# Patient Record
Sex: MALE | Race: WHITE
[De-identification: names, ages, dates, MRNs, and addresses within clinical notes are randomized per-mention and may not be internally consistent; named-entity substitution may affect disease eponyms.]

---

## 2017-05-05 ENCOUNTER — HOSPITAL ENCOUNTER (EMERGENCY)
Dept: HOSPITAL 17 - NEPD | Age: 52
Discharge: HOME | End: 2017-05-05
Payer: SELF-PAY

## 2017-05-05 VITALS
SYSTOLIC BLOOD PRESSURE: 135 MMHG | RESPIRATION RATE: 16 BRPM | HEART RATE: 74 BPM | OXYGEN SATURATION: 99 % | TEMPERATURE: 98.2 F | DIASTOLIC BLOOD PRESSURE: 74 MMHG

## 2017-05-05 VITALS — BODY MASS INDEX: 25.06 KG/M2 | HEIGHT: 68 IN | WEIGHT: 165.35 LBS

## 2017-05-05 DIAGNOSIS — F32.9: Primary | ICD-10-CM

## 2017-05-05 PROCEDURE — 99283 EMERGENCY DEPT VISIT LOW MDM: CPT

## 2017-05-05 NOTE — PD
HPI


Chief Complaint:  Depression


Time Seen by Provider:  16:42


Travel History


International Travel<30 days:  No


Contact w/Intl Traveler<30days:  No


Traveled to known affect area:  No





History of Present Illness


HPI


This patient has had long-standing low-grade depression.  He is a local patient 

with no family physician.  He is not suicidal or homicidal.  He has no 

intention to hurt himself.  He does binge on occasion but has not had any 

alcohol in the last few days.  He has no physical complaints whatsoever.  

Denies any drug use.  Severity is mild





PFSH


Past Medical History


Atrial Fibrillation:  Yes


Autoimmune Disease:  No


Anxiety:  Yes


Depression:  Yes


Heart Rhythm Problems:  Yes (A FIB)


Cardiovascular Problems:  Yes


Diminished Hearing:  No


Endocrine:  No (PER MEDICAL RECORD)


Gastrointestinal Disorders:  Yes


Genitourinary:  No


Hypertension:  Yes


Immune Disorder:  No


Musculoskeletal:  No


Neurologic:  Yes


Psychiatric:  Yes


Reproductive:  No


Respiratory:  No


Immunizations Current:  Yes


Seizures:  Yes


Ulcer:  Yes (GI ULCERS)





Past Surgical History


Endocrine Surgery:  No


Other Surgery:  Yes





Social History


Alcohol Use:  Yes


Tobacco Use:  Yes


Substance Use:  Yes





Allergies-Medications


(Allergen,Severity, Reaction):  


Coded Allergies:  


     No Known Allergies (Verified , 5/5/17)


Reported Meds & Prescriptions





Reported Meds & Active Scripts


Active


Clindamycin (Clindamycin HCl) 150 Mg Cap 2 Tab PO Q6H 10 Days


Naprosyn (Naproxen) 500 Mg Tab 500 Mg PO Q12HR PRN


Vitamin B-1 (Thiamine HCl) 100 Mg Tab 100 Mg PO DAILY


Folate (Folic Acid) 1 Mg Tab 1 Mg PO DAILY


Doxepin (Doxepin HCl) 50 Mg Cap 50 Mg PO BID


Calamine 8-8 % (Calamine-Zinc Oxide) 1 Lot Lot 1 Applic TOPICAL Q6H PRN


Aripiprazole 5 Mg Tab 5 Mg PO BID








Review of Systems


General / Constitutional:  No: Fever


HENT:  No: Headaches


Cardiovascular:  No: Chest Pain or Discomfort


Respiratory:  No: Cough





Physical Exam


Narrative


GASTROINTESTINAL:  Abdomen soft, non-tender, nondistended. Positive bowel 

sounds.  No hepato-splenomegaly, or palpable masses. No guarding.





SKIN: Focused skin assessment reveals no rash or ulcers. Skin is warm and dry.  

Palpation shows no induration or nodules.





NECK:  Symmetrical appearance, midline trachea.  No mass or crepitus.  Thyroid 

without enlargement, tenderness, or mass.





Data


Data


Last Documented VS





Vital Signs








  Date Time  Temp Pulse Resp B/P Pulse Ox O2 Delivery O2 Flow Rate FiO2


 


5/5/17 15:59 98.2 74 16 135/74 99   











MDM


Medical Decision Making


Medical Screen Exam Complete:  Yes


Emergency Medical Condition:  Yes


Medical Record Reviewed:  Yes


Differential Diagnosis


Depression, anxiety, adjustment disorder


Narrative Course


I have reviewed the patient's electronic medical record.





Patient is stable for outpatient follow-up.


He was given information to try to follow up with the [facility] clinic


He does not require emergent psychiatric screening





Diagnosis





 Primary Impression:  


 Depression


 Qualified Code:  F32.9 - Depression, unspecified depression type





***Additional Instructions:


The patient was advised to follow up with their physician and return if they 

worsen.


***Med/Other Pt SpecificInfo:  Other


Disposition:  01 DISCHARGE HOME


Condition:  Stable








Nilesh Gaspar MD May 5, 2017 16:46

## 2018-02-03 ENCOUNTER — HOSPITAL ENCOUNTER (EMERGENCY)
Dept: HOSPITAL 17 - NEPD | Age: 53
LOS: 1 days | Discharge: HOME | End: 2018-02-04
Payer: COMMERCIAL

## 2018-02-03 VITALS — WEIGHT: 132.28 LBS | HEIGHT: 65 IN | BODY MASS INDEX: 22.04 KG/M2

## 2018-02-03 VITALS
SYSTOLIC BLOOD PRESSURE: 137 MMHG | RESPIRATION RATE: 17 BRPM | HEART RATE: 89 BPM | TEMPERATURE: 97.8 F | DIASTOLIC BLOOD PRESSURE: 81 MMHG | OXYGEN SATURATION: 99 %

## 2018-02-03 DIAGNOSIS — F20.9: ICD-10-CM

## 2018-02-03 DIAGNOSIS — F17.200: ICD-10-CM

## 2018-02-03 DIAGNOSIS — F41.9: ICD-10-CM

## 2018-02-03 DIAGNOSIS — F32.9: ICD-10-CM

## 2018-02-03 DIAGNOSIS — F10.129: Primary | ICD-10-CM

## 2018-02-03 DIAGNOSIS — I10: ICD-10-CM

## 2018-02-03 DIAGNOSIS — I48.91: ICD-10-CM

## 2018-02-03 DIAGNOSIS — F12.90: ICD-10-CM

## 2018-02-03 PROCEDURE — 99282 EMERGENCY DEPT VISIT SF MDM: CPT
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
## 2018-02-23 ENCOUNTER — HOSPITAL ENCOUNTER (EMERGENCY)
Dept: HOSPITAL 17 - NEPC | Age: 53
Discharge: HOME | End: 2018-02-23
Payer: SELF-PAY

## 2018-02-23 VITALS
OXYGEN SATURATION: 98 % | RESPIRATION RATE: 18 BRPM | HEART RATE: 96 BPM | DIASTOLIC BLOOD PRESSURE: 78 MMHG | TEMPERATURE: 98.7 F | SYSTOLIC BLOOD PRESSURE: 177 MMHG

## 2018-02-23 VITALS — BODY MASS INDEX: 18.21 KG/M2 | HEIGHT: 68 IN | WEIGHT: 120.15 LBS

## 2018-02-23 DIAGNOSIS — S01.01XS: ICD-10-CM

## 2018-02-23 DIAGNOSIS — F41.9: ICD-10-CM

## 2018-02-23 DIAGNOSIS — I10: ICD-10-CM

## 2018-02-23 DIAGNOSIS — L02.414: Primary | ICD-10-CM

## 2018-02-23 DIAGNOSIS — F20.9: ICD-10-CM
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
## 2018-02-23 DIAGNOSIS — F32.9: ICD-10-CM

## 2018-02-23 DIAGNOSIS — I48.91: ICD-10-CM

## 2018-02-23 DIAGNOSIS — R56.9: ICD-10-CM

## 2018-02-23 PROCEDURE — 73090 X-RAY EXAM OF FOREARM: CPT

## 2018-02-23 PROCEDURE — 70450 CT HEAD/BRAIN W/O DYE: CPT

## 2018-02-23 PROCEDURE — 10060 I&D ABSCESS SIMPLE/SINGLE: CPT
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
## 2018-02-23 NOTE — RADRPT
EXAM DATE/TIME:  02/23/2018 09:04 

 

[facility] COMPARISON:     

CT FACIAL BONES W/O CONTRAST, July 12, 2017, 11:45.

 

 

INDICATIONS :     

Fall one week ago, skin complaint to forehead. 

                      

 

RADIATION DOSE:     

35.71 CTDIvol (mGy) 

 

 

 

MEDICAL HISTORY :     

Seizures. Hypertension. Cardiovascular disease

 

SURGICAL HISTORY :      

None. 

 

ENCOUNTER:      

Initial

 

ACUITY:      

1 day

 

PAIN SCALE:      

0/10

 

LOCATION:        

cranial 

 

TECHNIQUE:     

Multiple contiguous axial images were obtained of the head.  Using automated exposure control and adj
ustment of the mA and/or kV according to patient size, radiation dose was kept as low as reasonably a
chievable to obtain optimal diagnostic quality images.   DICOM format image data is available electro
nically for review and comparison.  

 

FINDINGS:     

 

CEREBRUM:     

The ventricles are normal for age.  No evidence of midline shift, mass lesion, hemorrhage or acute in
farction.  No extra-axial fluid collections are seen.

 

POSTERIOR FOSSA:     

The cerebellum and brainstem are intact.  The 4th ventricle is midline.  The cerebellopontine angle i
s unremarkable.

 

EXTRACRANIAL:     

The visualized portion of the orbits is intact.

 

SKULL:     

The calvaria is intact.  No evidence of skull fracture.

 

CONCLUSION:     

1. No acute intra-or abnormality identified.

 

 

 

 John Man MD on February 23, 2018 at 9:12           

Board Certified Radiologist.

 This report was verified electronically.

## 2018-02-23 NOTE — PD
Physical Exam


Time Seen by Provider:  10:26


Narrative


I was asked to incise and drain the abscess of the left forearm.





Data


Data


Last Documented VS





Vital Signs








  Date Time  Temp Pulse Resp B/P (MAP) Pulse Ox O2 Delivery O2 Flow Rate FiO2


 


2/23/18 08:19  94 18     


 


2/23/18 08:02 98.7   177/78 (111) 98 Room Air  








Orders





 Orders


Ct Brain W/O Iv Contrast(Rout) (2/23/18 )


Forearm (2vws) (2/23/18 )


Lidocai-Epi 2%-1:100,000 Inj (Xylocaine- (2/23/18 08:30)


Lidocai-Epi 2%-1:100,000 Inj (Xylocaine- (2/23/18 08:31)








MDM


Supervised Visit with AISHWARYA:  No


Narrative Course


I was asked to incise and drain the abscess of the left forearm.  On my 

examination of the abscess there is no fluctuance and it appears open and 

already drained.  There is some surrounding erythema.  The patient states that 

it has been draining purulent drainage and he has been squeezing it.  Wound 

culture obtained.  See Dr. Bejarano's note for final disposition.


Scripts


No Active Prescriptions or Reported Meds











Joan Belle Feb 23, 2018 10:28

## 2018-02-23 NOTE — RADRPT
EXAM DATE/TIME:  02/23/2018 09:25 

 

[facility] COMPARISON:     

No previous studies available for comparison.

 

                     

INDICATIONS :     

Left forearm pain, fall.

                     

 

MEDICAL HISTORY :     

None.          

 

SURGICAL HISTORY :     

None.   

 

ENCOUNTER:     

Initial                                        

 

ACUITY:     

2 weeks      

 

PAIN SCORE:     

5/10

 

LOCATION:     

Left middle forearm

 

FINDINGS:     

Two view examination of the left forearm demonstrates no evidence of fracture or dislocation.  Bony m
ineralization is normal.  There is prominent soft tissue swelling about the mid forearm; no radiopaqu
e foreign body seen..

 

CONCLUSION:     

1. The osseous structures are intact.

2. Prominent soft tissue thickening/swelling mid forearm.  A

 

 

 

 Chau Wilson MD on February 23, 2018 at 9:48           

Board Certified Radiologist.

 This report was verified electronically.

## 2018-02-27 ENCOUNTER — HOSPITAL ENCOUNTER (EMERGENCY)
Dept: HOSPITAL 17 - NEPK | Age: 53
Discharge: HOME | End: 2018-02-27
Payer: SELF-PAY

## 2018-02-27 VITALS
TEMPERATURE: 98.7 F | RESPIRATION RATE: 16 BRPM | SYSTOLIC BLOOD PRESSURE: 153 MMHG | DIASTOLIC BLOOD PRESSURE: 89 MMHG | OXYGEN SATURATION: 100 % | HEART RATE: 106 BPM
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
## 2018-02-27 DIAGNOSIS — T22.212D: Primary | ICD-10-CM

## 2018-02-27 PROCEDURE — 16000 INITIAL TREATMENT OF BURN(S): CPT
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
## 2018-05-21 ENCOUNTER — HOSPITAL ENCOUNTER (EMERGENCY)
Dept: HOSPITAL 17 - NEPE | Age: 53
Discharge: HOME | End: 2018-05-21
Payer: SELF-PAY

## 2018-05-21 VITALS
HEART RATE: 100 BPM | DIASTOLIC BLOOD PRESSURE: 85 MMHG | OXYGEN SATURATION: 97 % | RESPIRATION RATE: 18 BRPM | SYSTOLIC BLOOD PRESSURE: 127 MMHG

## 2018-05-21 VITALS
SYSTOLIC BLOOD PRESSURE: 133 MMHG | DIASTOLIC BLOOD PRESSURE: 98 MMHG | OXYGEN SATURATION: 78 % | HEART RATE: 100 BPM | RESPIRATION RATE: 20 BRPM | TEMPERATURE: 97.4 F

## 2018-05-21 VITALS — HEIGHT: 68 IN | WEIGHT: 143.3 LBS | BODY MASS INDEX: 21.72 KG/M2

## 2018-05-21 VITALS
OXYGEN SATURATION: 99 % | HEART RATE: 107 BPM | SYSTOLIC BLOOD PRESSURE: 108 MMHG | RESPIRATION RATE: 18 BRPM | DIASTOLIC BLOOD PRESSURE: 89 MMHG

## 2018-05-21 DIAGNOSIS — R74.8: ICD-10-CM

## 2018-05-21 DIAGNOSIS — F17.200: ICD-10-CM

## 2018-05-21 DIAGNOSIS — F12.90: ICD-10-CM

## 2018-05-21 DIAGNOSIS — G25.3: Primary | ICD-10-CM

## 2018-05-21 LAB
ALBUMIN SERPL-MCNC: 2.7 GM/DL (ref 3.4–5)
ALP SERPL-CCNC: 210 U/L (ref 45–117)
ALT SERPL-CCNC: 1369 U/L (ref 12–78)
AST SERPL-CCNC: 642 U/L (ref 15–37)
BASOPHILS # BLD AUTO: 0.1 TH/MM3 (ref 0–0.2)
BASOPHILS NFR BLD: 0.7 % (ref 0–2)
BILIRUB SERPL-MCNC: 0.4 MG/DL (ref 0.2–1)
BUN SERPL-MCNC: 29 MG/DL (ref 7–18)
CALCIUM SERPL-MCNC: 8.2 MG/DL (ref 8.5–10.1)
CHLORIDE SERPL-SCNC: 104 MEQ/L (ref 98–107)
CREAT SERPL-MCNC: 1.23 MG/DL (ref 0.6–1.3)
EOSINOPHIL # BLD: 0.1 TH/MM3 (ref 0–0.4)
EOSINOPHIL NFR BLD: 1 % (ref 0–4)
ERYTHROCYTE [DISTWIDTH] IN BLOOD BY AUTOMATED COUNT: 16.5 % (ref 11.6–17.2)
GLUCOSE SERPL-MCNC: 112 MG/DL (ref 74–106)
HCO3 BLD-SCNC: 25.3 MEQ/L (ref 21–32)
HCT VFR BLD CALC: 37.6 % (ref 39–51)
HGB BLD-MCNC: 12.1 GM/DL (ref 13–17)
LYMPHOCYTES # BLD AUTO: 1.2 TH/MM3 (ref 1–4.8)
LYMPHOCYTES NFR BLD AUTO: 12.5 % (ref 9–44)
MCH RBC QN AUTO: 29.2 PG (ref 27–34)
MCHC RBC AUTO-ENTMCNC: 32.2 % (ref 32–36)
MCV RBC AUTO: 90.5 FL (ref 80–100)
MONOCYTE #: 1.2 TH/MM3 (ref 0–0.9)
MONOCYTES NFR BLD: 12 % (ref 0–8)
NEUTROPHILS # BLD AUTO: 7.3 TH/MM3 (ref 1.8–7.7)
NEUTROPHILS NFR BLD AUTO: 73.8 % (ref 16–70)
PLATELET # BLD: 226 TH/MM3 (ref 150–450)
PMV BLD AUTO: 8.5 FL (ref 7–11)
PROT SERPL-MCNC: 5.8 GM/DL (ref 6.4–8.2)
RBC # BLD AUTO: 4.16 MIL/MM3 (ref 4.5–5.9)
SODIUM SERPL-SCNC: 137 MEQ/L (ref 136–145)
WBC # BLD AUTO: 10 TH/MM3 (ref 4–11)

## 2018-05-21 PROCEDURE — 80053 COMPREHEN METABOLIC PANEL: CPT

## 2018-05-21 PROCEDURE — 99284 EMERGENCY DEPT VISIT MOD MDM: CPT

## 2018-05-21 PROCEDURE — 96374 THER/PROPH/DIAG INJ IV PUSH: CPT

## 2018-05-21 PROCEDURE — 85025 COMPLETE CBC W/AUTO DIFF WBC: CPT

## 2018-05-21 PROCEDURE — 82550 ASSAY OF CK (CPK): CPT

## 2018-05-21 NOTE — PD
Physical Exam


Narrative


General: 


The patient is a well-developed disheveled appearing male in no acute distress.





Head and Neck exam: 


Head is normocephalic atraumatic. 


Eyes: EOMI, pupils are equal round and reactive to light. 


Nose: Midline septum with pink mucous membranes 


Mouth: Dentition unremarkable. Moist mucus membranes. Posterior oropharynx is 

not erythematous. No tonsillar hypertrophy. Uvula midline. Airway patent. 


Neck: No palpable lymphadenopathy. No nuchal rigidity. No thyromegaly. 





Cardiovascular: 


Sinus tachycardia in the low 100s without murmurs, gallops, or rubs. No pulse 

deficit to the extremities on simultaneous auscultation and palpation of his 

radial artery. 





Lungs: 


Clear to auscultation bilaterally. No wheezes, rhonchi, or rales.


 


Abdomen:


Soft, without tenderness to palpation in all 4 quadrants of the abdomen. No 

guarding, rebound, or rigidity.  Normal bowel sounds are audible.  No 

tenderness on palpation of McBurney's point.





Extremities: 


No clubbing, cyanosis, or edema. 2+ pulses in all 4 extremities.  No calf 

tenderness on palpation.





Back: 


No costovertebral angle tenderness to palpation. 





Neurologic Exam: Grossly nonfocal.  The patient has no further myoclonic 

jerking or repetitive movements noted.  No tremulousness noted.  No asterixis 

on exam.





Skin Exam: No rash noted. Intact skin that is warm and dry.





Data


Data


Last Documented VS





Vital Signs








  Date Time  Temp Pulse Resp B/P (MAP) Pulse Ox O2 Delivery O2 Flow Rate FiO2


 


5/21/18 19:59  100 18 127/85 (99) 97 Room Air  


 


5/21/18 19:12       4.00 


 


5/21/18 17:22 97.4       








Orders





 Orders


Complete Blood Count With Diff (5/21/18 18:21)


Comprehensive Metabolic Panel (5/21/18 18:21)


Creatine Kinase (Cpk) (5/21/18 18:21)


Lorazepam Inj (Ativan Inj) (5/21/18 18:45)


Sodium Chlorid 0.9% 500 Ml Inj (Ns 500 M (5/21/18 19:45)





Labs





Laboratory Tests








Test


  5/21/18


18:40


 


White Blood Count 10.0 TH/MM3 


 


Red Blood Count 4.16 MIL/MM3 


 


Hemoglobin 12.1 GM/DL 


 


Hematocrit 37.6 % 


 


Mean Corpuscular Volume 90.5 FL 


 


Mean Corpuscular Hemoglobin 29.2 PG 


 


Mean Corpuscular Hemoglobin


Concent 32.2 % 


 


 


Red Cell Distribution Width 16.5 % 


 


Platelet Count 226 TH/MM3 


 


Mean Platelet Volume 8.5 FL 


 


Neutrophils (%) (Auto) 73.8 % 


 


Lymphocytes (%) (Auto) 12.5 % 


 


Monocytes (%) (Auto) 12.0 % 


 


Eosinophils (%) (Auto) 1.0 % 


 


Basophils (%) (Auto) 0.7 % 


 


Neutrophils # (Auto) 7.3 TH/MM3 


 


Lymphocytes # (Auto) 1.2 TH/MM3 


 


Monocytes # (Auto) 1.2 TH/MM3 


 


Eosinophils # (Auto) 0.1 TH/MM3 


 


Basophils # (Auto) 0.1 TH/MM3 


 


CBC Comment DIFF FINAL 


 


Differential Comment  


 


Blood Urea Nitrogen 29 MG/DL 


 


Creatinine 1.23 MG/DL 


 


Random Glucose 112 MG/DL 


 


Total Protein 5.8 GM/DL 


 


Albumin 2.7 GM/DL 


 


Calcium Level 8.2 MG/DL 


 


Alkaline Phosphatase 210 U/L 


 


Aspartate Amino Transf


(AST/SGOT) 642 U/L 


 


 


Alanine Aminotransferase


(ALT/SGPT) 1369 U/L 


 


 


Total Bilirubin 0.4 MG/DL 


 


Sodium Level 137 MEQ/L 


 


Potassium Level 5.1 MEQ/L 


 


Chloride Level 104 MEQ/L 


 


Carbon Dioxide Level 25.3 MEQ/L 


 


Anion Gap 8 MEQ/L 


 


Total Creatine Kinase 225 U/L 











[facility]


Medical Record Reviewed:  Yes


Supervised Visit with AISHWARYA:  No


Narrative Course


During the course of the patient's emergency department visit, the patient's 

history, examination, and differential diagnosis were reviewed with the 

patient. The patient was placed on a cardiac monitor with oximetry and frequent 

blood pressure monitoring. The patient had  IV access obtained and blood work 

sent for analysis.  The patient's case was checked out to me by Dr. Gonzalez.  

Please see her complete history and physical.  The patient's case was checked 

out to me at the conclusion of her shift.  The patient presented with 

generalized body aches.  The patient reports having history of intermittent 

jerking movements of his extremities.  He reports that he has been diagnosed 

with restless leg syndrome.  The patient on my arrival is resting, sleeping 

lying on his left side with no abnormal movements noted.  A tray of food is 

available at the bedside and has been ingested by the patient.





The patient was initially provided Ativan 1 mg IV.





The patient's laboratory studies were reviewed and remarkable for a white count 

of 10, hemoglobin 12.1 which is stable compared to prior levels, platelets 226 

was 73.8 neutrophils, monocytes 12, CMP is remarkable for a BUN of 29, glucose 

112, calcium 8.2, , ALT 1369, alk phos 210, albumin 2.7.  CPK is within 

normal limits at 225 ruling out rhabdomyolysis.





The patient is noted to have elevated liver enzymes.  The patient has had 

elevated liver enzymes in the past.  There is some report of the patient 

abusing alcohol in the past, however the patient denies any alcohol abuse 

recently.  The patient reports that he is followed by the [facility] for his 

primary care.  The patient is given a copy of his recent laboratory studies.  

The patient will be given a lab slip to obtain repeat LFTs and a viral 

hepatitis panel in 1 week.  Patient is instructed to follow-up with a 

gastroenterologist regarding this.  The patient on examination has no abdominal 

pain noted.  Regarding the patient's myoclonic movements.  These resolved with 

the use of Ativan.  As recommended previously by Dr. Lala, the patient will be 

referred to a neurologist for follow-up.  He is given the name of the 

neurologist on-call, Dr. Banegas.  As well as a gastroenterologist on-call, 

Dr. Fontanez.  The patient is given a prescription for Klonopin for the 

myoclonic movements.  The patient regarding the elevated liver function tests 

is instructed to avoid alcohol and Tylenol/acetaminophen-containing substances.





The patient is resting comfortably and feels better, is alert and in no 

distress. The patient's results and examination findings were discussed with 

the patient. The repeat examination is unremarkable and benign. The history, 

exam, diagnostic testing, and current condition do not suggest any significant 

pathology to warrant further testing, continued ED treatment, admission, or 

surgical evaluation at this point. The vital signs have been stable. The 

patient does not have uncontrollable pain, intractable vomiting, or other 

significant symptoms. The patient's condition is stable and appropriate for 

discharge. The patient will pursue further outpatient evaluation with a primary 

care physician or other designated or consulting physician as indicated in the 

discharge instructions.  The patient is instructed to report back to the 

emergency department immediately for reexamination  in the mean time if he/ she 

develops any new or worsening signs or symptoms.  The patient expressed 

understanding and was agreeable with this plan.


Diagnosis





 Primary Impression:  


 Movement disorder


 Additional Impression:  


 Elevated liver enzymes


Referrals:  


Diogenes Banegas MD


1 week





Arya Rosario MD


2 weeks





[facility]


2 days





[facility]


2 days





***Additional Instruction:  


The patient is noted to have elevated liver enzymes.  The patient has had 

elevated liver enzymes in the past.  There is some report of the patient 

abusing alcohol in the past, however the patient denies any alcohol abuse 

recently.  The patient reports that he is followed by the [facility] for his 

primary care.  The patient is given a copy of his recent laboratory studies.  

The patient will be given a lab slip to obtain repeat LFTs and a viral 

hepatitis panel in 1 week.  Patient is instructed to follow-up with a 

gastroenterologist regarding this.  The patient on examination has no abdominal 

pain noted.  Regarding the patient's myoclonic movements.  These resolved with 

the use of Ativan.  As recommended previously by Dr. Lala, the patient will be 

referred to a neurologist for follow-up.  He is given the name of the 

neurologist on-call, Dr. Banegas.  As well as a gastroenterologist on-call, 

Dr. Fontanez.  The patient is given a prescription for Klonopin for the 

myoclonic movements.  The patient regarding the elevated liver function tests 

is instructed to avoid alcohol and Tylenol/acetaminophen-containing substances.


***Med/Other Pt SpecificInfo:  Prescription(s) given


Scripts


Clonazepam (Klonopin) 0.5 Mg Tab


0.5 MG PO BID Y for SPASM, #10 TAB 0 Refills


   Prov: Ivory Torres MD         5/21/18


Disposition:  01 DISCHARGE HOME


Condition:  Stable











Ivory Torres MD May 21, 2018 19:34

## 2018-05-21 NOTE — PD
HPI


Chief Complaint:  Pain: Acute or Chronic


Time Seen by Provider:  18:20


Travel History


International Travel<30 days:  No


Contact w/Intl Traveler<30days:  No


Traveled to known affect area:  No





History of Present Illness


HPI


This is a 52-year-old male who presents to the emergency department with pain 

all over his body described as cramping, constant, moderate severity associated 

with involuntary movements of his legs.  He says he has never had anything like 

this before.  He denies any other symptoms.  He says this is been going on for 3

-4 days.





PFSH


Past Medical History


Atrial Fibrillation:  Yes


Autoimmune Disease:  No


Anxiety:  Yes


Depression:  Yes


Heart Rhythm Problems:  Yes (A FIB)


Cardiovascular Problems:  Yes


Diabetes:  No


Diminished Hearing:  No


Gastrointestinal Disorders:  Yes


Genitourinary:  No


Hypertension:  Yes


Immune Disorder:  No


Musculoskeletal:  No


Neurologic:  No


Psychiatric:  Yes (STATES HE WAS DIAGNOSED WITH SCHIZOPHRENIA PREVIOUSLY BUT 

DOESNT TAKE MEDS)


Reproductive:  No


Respiratory:  Yes (BRONCHITIS)


Immunizations Current:  Yes


Seizures:  Yes


Ulcer:  Yes (GI ULCERS)





Past Surgical History


Endocrine Surgery:  No


Other Surgery:  Yes





Social History


Alcohol Use:  Yes (OCCASIONALLY)


Tobacco Use:  Yes (1/2 PPD)


Substance Use:  Yes (MARIJUANA SOMETIMES, denies IVDA)





Allergies-Medications


(Allergen,Severity, Reaction):  


Coded Allergies:  


     No Known Allergies (Verified  Adverse Reaction, Unknown, 2/23/18)


Reported Meds & Prescriptions





Reported Meds & Active Scripts


Active


No Active Prescriptions or Reported Medications    








Review of Systems


Except as stated in HPI:  all other systems reviewed are Neg





Physical Exam


Narrative


GENERAL:Well appearing, no acute distress


SKIN: Focused skin assessment warm and dry.


HEAD: Atraumatic. Normocephalic. 


EYES: Pupils equal and round.  No injection or drainage. 


ENT:  Moist mucous membranes


NECK: Trachea midline. 


CARDIOVASCULAR: Regular rate and rhythm.  No murmur appreciated.


RESPIRATORY: Clear to auscultation. Breath sounds equal bilaterally. 


GASTROINTESTINAL: Abdomen soft, non-tender, nondistended. 


MUSCULOSKELETAL: No obvious deformities. 


NEUROLOGICAL: Awake and alert. No obvious cranial nerve deficits.  Moving all 

extremities.  Involuntary movement of the left leg intermittently.


PSYCHIATRIC: Appropriate mood and affect; insight and judgment normal.





Data


Data


Last Documented VS





Vital Signs








  Date Time  Temp Pulse Resp B/P (MAP) Pulse Ox O2 Delivery O2 Flow Rate FiO2


 


5/21/18 19:12  107 18 108/89 (95)  Room Air  


 


5/21/18 17:22 97.4       








Orders





 Orders


Complete Blood Count With Diff (5/21/18 18:21)


Comprehensive Metabolic Panel (5/21/18 18:21)


Creatine Kinase (Cpk) (5/21/18 18:21)


Lorazepam Inj (Ativan Inj) (5/21/18 18:45)


Ns (Bolus) Inj (5/21/18 19:45)





Labs





Laboratory Tests








Test


  5/21/18


18:40


 


White Blood Count 10.0 TH/MM3 


 


Red Blood Count 4.16 MIL/MM3 


 


Hemoglobin 12.1 GM/DL 


 


Hematocrit 37.6 % 


 


Mean Corpuscular Volume 90.5 FL 


 


Mean Corpuscular Hemoglobin 29.2 PG 


 


Mean Corpuscular Hemoglobin


Concent 32.2 % 


 


 


Red Cell Distribution Width 16.5 % 


 


Platelet Count 226 TH/MM3 


 


Mean Platelet Volume 8.5 FL 


 


Neutrophils (%) (Auto) 73.8 % 


 


Lymphocytes (%) (Auto) 12.5 % 


 


Monocytes (%) (Auto) 12.0 % 


 


Eosinophils (%) (Auto) 1.0 % 


 


Basophils (%) (Auto) 0.7 % 


 


Neutrophils # (Auto) 7.3 TH/MM3 


 


Lymphocytes # (Auto) 1.2 TH/MM3 


 


Monocytes # (Auto) 1.2 TH/MM3 


 


Eosinophils # (Auto) 0.1 TH/MM3 


 


Basophils # (Auto) 0.1 TH/MM3 


 


CBC Comment DIFF FINAL 


 


Differential Comment  











MDM


Medical Decision Making


Medical Screen Exam Complete:  Yes


Emergency Medical Condition:  Yes


Differential Diagnosis


Rhabdomyolysis, electrolyte abnormality, myoclonus


Narrative Course


This is a 52-year-old male who presents to the emergency department with 

intermittent involuntary jerking of his legs.  He has presented to the 

emergency department with this in the past and at that time his symptoms 

resolved with Ativan.  He was placed on a monitor and an IV was established.  

Labs will be obtained.  I think if the patient's symptoms subside he can be 

discharged home and referred outpatient to neurology.


Scripts


No Active Prescriptions or Reported Meds











Kesha Gonzalez MD May 21, 2018 18:33

## 2018-05-23 ENCOUNTER — HOSPITAL ENCOUNTER (EMERGENCY)
Dept: HOSPITAL 17 - NEPD | Age: 53
LOS: 1 days | Discharge: HOME | End: 2018-05-24
Payer: SELF-PAY

## 2018-05-23 VITALS — HEIGHT: 66 IN | WEIGHT: 143.3 LBS | BODY MASS INDEX: 23.03 KG/M2

## 2018-05-23 VITALS
TEMPERATURE: 97.4 F | HEART RATE: 114 BPM | DIASTOLIC BLOOD PRESSURE: 93 MMHG | RESPIRATION RATE: 26 BRPM | OXYGEN SATURATION: 96 % | SYSTOLIC BLOOD PRESSURE: 157 MMHG

## 2018-05-23 DIAGNOSIS — F17.210: ICD-10-CM

## 2018-05-23 DIAGNOSIS — J20.9: Primary | ICD-10-CM

## 2018-05-23 PROCEDURE — 99283 EMERGENCY DEPT VISIT LOW MDM: CPT

## 2018-05-23 PROCEDURE — 71045 X-RAY EXAM CHEST 1 VIEW: CPT

## 2018-05-23 NOTE — PD
HPI


Chief Complaint:  Respiratory Symptoms


Time Seen by Provider:  22:04


Travel History


International Travel<30 days:  No


Contact w/Intl Traveler<30days:  No


Traveled to known affect area:  No





History of Present Illness


HPI


This patient complains of cough and shortness of breath.  He has got a cough 

producing clear to yellow phlegm.  Duration 1 week.  Today started having some 

blood flecks in the sputum.  No fever or chest pain.  He is a chronic cigarette 

smoker and an alcoholic.  He was here 2 days ago and had a complete lab panel.  

Symptom severity is moderate.  No alleviating factors.  No pleuritic symptoms.  

Symptoms exacerbated by his cigarette smoking





PFSH


Past Medical History


Atrial Fibrillation:  Yes


Autoimmune Disease:  No


Anxiety:  Yes


Depression:  Yes


Heart Rhythm Problems:  Yes (A FIB)


Cardiovascular Problems:  Yes


Diabetes:  No


Diminished Hearing:  No


Gastrointestinal Disorders:  Yes


Genitourinary:  No


Hypertension:  Yes


Immune Disorder:  No


Musculoskeletal:  No


Neurologic:  No


Psychiatric:  Yes (STATES HE WAS DIAGNOSED WITH SCHIZOPHRENIA PREVIOUSLY BUT 

DOESNT TAKE MEDS)


Reproductive:  No


Respiratory:  Yes (BRONCHITIS)


Immunizations Current:  Yes


Seizures:  Yes


Ulcer:  Yes (GI ULCERS)





Past Surgical History


Endocrine Surgery:  No


Other Surgery:  Yes





Social History


Alcohol Use:  Yes (OCCASIONALLY)


Tobacco Use:  Yes (1/2 PPD)


Substance Use:  Yes (MARIJUANA SOMETIMES, denies IVDA)





Allergies-Medications


(Allergen,Severity, Reaction):  


Coded Allergies:  


     No Known Allergies (Verified  Allergy, Unknown, 5/23/18)


Reported Meds & Prescriptions





Reported Meds & Active Scripts


Active


Zithromax Z-Robin (Azithromycin) 250 Mg Dspk 250 Mg PO AS DIRECTED


     500 MG (2 tabs) day 1, then 1 tab days 2-5.


Ventolin Hfa 18 GM Inh (Albuterol Sulfate) 90 Mcg/Act Aer 1 Puff INH Q4H PRN


Klonopin (Clonazepam) 0.5 Mg Tab 0.5 Mg PO BID PRN








Review of Systems


General / Constitutional:  No: Fever


Eyes:  No: Visual changes


HENT:  Positive: Congestion, No: Headaches


Cardiovascular:  No: Chest Pain or Discomfort


Respiratory:  Positive: Cough, Shortness of Breath, Hemoptysis


Gastrointestinal:  No: Abdominal Pain


Genitourinary:  No: Dysuria


Musculoskeletal:  No: Pain


Skin:  No Rash


Neurologic:  No: Weakness


Psychiatric:  Positive: Substance Abuse, No: Depression


Endocrine:  No: Polydipsia


Hematologic/Lymphatic:  No: Easy Bruising





Physical Exam


Narrative


GENERAL: Thin cachectic well-developed patient with cough and congestion .


SKIN: Focused skin assessment reveals no rash and nodules. Skin is Warm and dry.


HEAD: Atraumatic. Normocephalic. 


EYES: Pupils equal and round. No scleral icterus. No injection or drainage. 


ENT: No nasal bleeding or discharge.  Mucous membranes pink and moist.


NECK: Trachea midline. No JVD. 


CARDIOVASCULAR: Regular rate and rhythm.  No murmur appreciated.


RESPIRATORY: No accessory muscle use. Clear to auscultation. Breath sounds 

equal bilaterally. 


GASTROINTESTINAL: Abdomen soft, non-tender, nondistended. Hepatic and splenic 

margins not palpable. 


MUSCULOSKELETAL: No obvious deformities. No clubbing.  No cyanosis.  No edema. 


NEUROLOGICAL: Awake and alert. No obvious cranial nerve deficits.  Motor 

grossly within normal limits. Normal speech.


PSYCHIATRIC: Appropriate mood and affect; insight and judgment poor .





Data


Data


Last Documented VS





Vital Signs








  Date Time  Temp Pulse Resp B/P (MAP) Pulse Ox O2 Delivery O2 Flow Rate FiO2


 


5/23/18 19:25 97.4 114 26 157/93 (114) 96   








Orders





 Orders


Chest, Single Ap (5/23/18 )








MDM


Medical Decision Making


Medical Screen Exam Complete:  Yes


Emergency Medical Condition:  Yes


Medical Record Reviewed:  Yes


Differential Diagnosis


Bronchitis, pneumonia, lung cancer


Narrative Course


 I have reviewed the patient's electronic medical record.  I reviewed his visit 

from 2 days ago.  I reviewed his lab studies.  He is an alcoholic with elevated 

LFTs as expected





I do not think repeating labs 2 days later would be helpful here.





I reviewed his chest x-ray and the radiology reading was noted.  No clinical 

suspicion of CHF.  He has a bronchitis-like picture and I gave him a Z-Robin and 

a breathing inhaler of albuterol


He is advised to work on a smoking but he does not seem motivated to do so





Diagnosis





 Primary Impression:  


 Acute bronchitis


 Qualified Codes:  J20.9 - Acute bronchitis, unspecified


 Additional Impression:  


 Smoker





***Additional Instructions:  


The patient was advised to follow up with their physician and return if they 

worsen.


Stop smoking


***Med/Other Pt SpecificInfo:  Prescription(s) given


Scripts


Azithromycin (Zithromax Z-Robin) 250 Mg Dspk


250 MG PO AS DIRECTED for Infection, #1 DSPK 0 Refills


   500 MG (2 tabs) day 1, then 1 tab days 2-5.


   Prov: Nilesh Gaspar MD         5/24/18 


Albuterol 18 GM Inh (Ventolin Hfa 18 GM Inh) 90 Mcg/Act Aer


1 PUFF INH Q4H Y for SHORTNESS OF BREATH, #1 INHALER 0 Refills


   Prov: Nilesh Gaspar MD         5/24/18


Disposition:  01 DISCHARGE HOME


Condition:  Stable











Nilesh Gaspar MD May 23, 2018 22:19

## 2018-05-23 NOTE — RADRPT
EXAM DATE:  5/23/2018 10:59 PM EDT

AGE/SEX:        52 years / Male



INDICATIONS:  Cough. Hemoptosis.



CLINICAL DATA:  This is the patient's initial encounter. Patient reports that signs and symptoms have
 been present for 1 day and indicates a pain score of 0/10. 

                                                                          

MEDICAL/SURGICAL HISTORY:       Hypertension. Atrial fibrillation.  None.



[place]      [facility] [place]  .



FINDINGS:  

A single AP erect portable view of the chest was obtained. The heart size is mild to moderately enlar
ged. There is mild hazy opacity in both lung bases. There is mild motion artifact. The bony thorax is
 intact in appearance.



CONCLUSION: 

1.  Cardiomegaly with hazy opacity in the lung bases. This could represent mild pulmonary edema.

2.  Mild motion artifact.



Electronically signed by: Minor Lambert MD  5/23/2018 11:04 PM EDT

## 2018-05-24 ENCOUNTER — HOSPITAL ENCOUNTER (INPATIENT)
Dept: HOSPITAL 17 - NEPC | Age: 53
LOS: 7 days | Discharge: HOME | DRG: 292 | End: 2018-05-31
Attending: HOSPITALIST | Admitting: HOSPITALIST
Payer: COMMERCIAL

## 2018-05-24 VITALS
RESPIRATION RATE: 18 BRPM | OXYGEN SATURATION: 96 % | TEMPERATURE: 96.8 F | DIASTOLIC BLOOD PRESSURE: 89 MMHG | HEART RATE: 101 BPM | SYSTOLIC BLOOD PRESSURE: 122 MMHG

## 2018-05-24 VITALS — HEIGHT: 69 IN | WEIGHT: 115.3 LBS | BODY MASS INDEX: 17.08 KG/M2

## 2018-05-24 VITALS
DIASTOLIC BLOOD PRESSURE: 71 MMHG | HEART RATE: 104 BPM | TEMPERATURE: 98.4 F | OXYGEN SATURATION: 95 % | RESPIRATION RATE: 17 BRPM | SYSTOLIC BLOOD PRESSURE: 119 MMHG

## 2018-05-24 VITALS
OXYGEN SATURATION: 92 % | TEMPERATURE: 98.3 F | HEART RATE: 97 BPM | RESPIRATION RATE: 18 BRPM | DIASTOLIC BLOOD PRESSURE: 73 MMHG | SYSTOLIC BLOOD PRESSURE: 116 MMHG

## 2018-05-24 VITALS
RESPIRATION RATE: 16 BRPM | SYSTOLIC BLOOD PRESSURE: 119 MMHG | OXYGEN SATURATION: 95 % | DIASTOLIC BLOOD PRESSURE: 83 MMHG | TEMPERATURE: 97.9 F | HEART RATE: 97 BPM

## 2018-05-24 VITALS
SYSTOLIC BLOOD PRESSURE: 132 MMHG | HEART RATE: 100 BPM | OXYGEN SATURATION: 100 % | DIASTOLIC BLOOD PRESSURE: 102 MMHG | TEMPERATURE: 97.1 F | RESPIRATION RATE: 18 BRPM

## 2018-05-24 VITALS — DIASTOLIC BLOOD PRESSURE: 82 MMHG | SYSTOLIC BLOOD PRESSURE: 148 MMHG

## 2018-05-24 VITALS — OXYGEN SATURATION: 98 %

## 2018-05-24 DIAGNOSIS — G89.29: ICD-10-CM

## 2018-05-24 DIAGNOSIS — M54.5: ICD-10-CM

## 2018-05-24 DIAGNOSIS — E87.5: ICD-10-CM

## 2018-05-24 DIAGNOSIS — I50.21: Primary | ICD-10-CM

## 2018-05-24 DIAGNOSIS — F17.210: ICD-10-CM

## 2018-05-24 DIAGNOSIS — R12: ICD-10-CM

## 2018-05-24 DIAGNOSIS — F41.9: ICD-10-CM

## 2018-05-24 DIAGNOSIS — I51.3: ICD-10-CM

## 2018-05-24 DIAGNOSIS — F14.10: ICD-10-CM

## 2018-05-24 DIAGNOSIS — Z59.0: ICD-10-CM

## 2018-05-24 DIAGNOSIS — J40: ICD-10-CM

## 2018-05-24 DIAGNOSIS — I42.0: ICD-10-CM

## 2018-05-24 LAB
ALBUMIN SERPL-MCNC: 3 GM/DL (ref 3.4–5)
ALP SERPL-CCNC: 228 U/L (ref 45–117)
ALT SERPL-CCNC: 939 U/L (ref 12–78)
AST SERPL-CCNC: 156 U/L (ref 15–37)
BASOPHILS # BLD AUTO: 0.1 TH/MM3 (ref 0–0.2)
BASOPHILS NFR BLD: 0.7 % (ref 0–2)
BILIRUB SERPL-MCNC: 0.7 MG/DL (ref 0.2–1)
BUN SERPL-MCNC: 23 MG/DL (ref 7–18)
CALCIUM SERPL-MCNC: 8.4 MG/DL (ref 8.5–10.1)
CHLORIDE SERPL-SCNC: 103 MEQ/L (ref 98–107)
CREAT SERPL-MCNC: 1.28 MG/DL (ref 0.6–1.3)
EOSINOPHIL # BLD: 0.1 TH/MM3 (ref 0–0.4)
EOSINOPHIL NFR BLD: 0.4 % (ref 0–4)
ERYTHROCYTE [DISTWIDTH] IN BLOOD BY AUTOMATED COUNT: 16.6 % (ref 11.6–17.2)
GFR SERPLBLD BASED ON 1.73 SQ M-ARVRAT: 59 ML/MIN (ref 89–?)
GLUCOSE SERPL-MCNC: 99 MG/DL (ref 74–106)
HCO3 BLD-SCNC: 21.5 MEQ/L (ref 21–32)
HCT VFR BLD CALC: 39.4 % (ref 39–51)
HGB BLD-MCNC: 12.5 GM/DL (ref 13–17)
LYMPHOCYTES # BLD AUTO: 1 TH/MM3 (ref 1–4.8)
LYMPHOCYTES NFR BLD AUTO: 7.4 % (ref 9–44)
MCH RBC QN AUTO: 28.9 PG (ref 27–34)
MCHC RBC AUTO-ENTMCNC: 31.8 % (ref 32–36)
MCV RBC AUTO: 91 FL (ref 80–100)
MONOCYTE #: 1.1 TH/MM3 (ref 0–0.9)
MONOCYTES NFR BLD: 8.2 % (ref 0–8)
NEUTROPHILS # BLD AUTO: 10.7 TH/MM3 (ref 1.8–7.7)
NEUTROPHILS NFR BLD AUTO: 83.3 % (ref 16–70)
PLATELET # BLD: 182 TH/MM3 (ref 150–450)
PMV BLD AUTO: 8.7 FL (ref 7–11)
PROT SERPL-MCNC: 6.6 GM/DL (ref 6.4–8.2)
RBC # BLD AUTO: 4.33 MIL/MM3 (ref 4.5–5.9)
SODIUM SERPL-SCNC: 136 MEQ/L (ref 136–145)
WBC # BLD AUTO: 12.8 TH/MM3 (ref 4–11)

## 2018-05-24 PROCEDURE — 85025 COMPLETE CBC W/AUTO DIFF WBC: CPT

## 2018-05-24 PROCEDURE — 83880 ASSAY OF NATRIURETIC PEPTIDE: CPT

## 2018-05-24 PROCEDURE — G0378 HOSPITAL OBSERVATION PER HR: HCPCS

## 2018-05-24 PROCEDURE — 84484 ASSAY OF TROPONIN QUANT: CPT

## 2018-05-24 PROCEDURE — 80069 RENAL FUNCTION PANEL: CPT

## 2018-05-24 PROCEDURE — 99283 EMERGENCY DEPT VISIT LOW MDM: CPT

## 2018-05-24 PROCEDURE — 85730 THROMBOPLASTIN TIME PARTIAL: CPT

## 2018-05-24 PROCEDURE — 85610 PROTHROMBIN TIME: CPT

## 2018-05-24 PROCEDURE — 80048 BASIC METABOLIC PNL TOTAL CA: CPT

## 2018-05-24 PROCEDURE — 80053 COMPREHEN METABOLIC PANEL: CPT

## 2018-05-24 PROCEDURE — 80074 ACUTE HEPATITIS PANEL: CPT

## 2018-05-24 PROCEDURE — 84132 ASSAY OF SERUM POTASSIUM: CPT

## 2018-05-24 PROCEDURE — 80076 HEPATIC FUNCTION PANEL: CPT

## 2018-05-24 PROCEDURE — 93306 TTE W/DOPPLER COMPLETE: CPT

## 2018-05-24 PROCEDURE — 93005 ELECTROCARDIOGRAM TRACING: CPT

## 2018-05-24 PROCEDURE — 83735 ASSAY OF MAGNESIUM: CPT

## 2018-05-24 PROCEDURE — 96374 THER/PROPH/DIAG INJ IV PUSH: CPT

## 2018-05-24 PROCEDURE — 76937 US GUIDE VASCULAR ACCESS: CPT

## 2018-05-24 PROCEDURE — 76705 ECHO EXAM OF ABDOMEN: CPT

## 2018-05-24 PROCEDURE — 80307 DRUG TEST PRSMV CHEM ANLYZR: CPT

## 2018-05-24 PROCEDURE — 85027 COMPLETE CBC AUTOMATED: CPT

## 2018-05-24 PROCEDURE — 96376 TX/PRO/DX INJ SAME DRUG ADON: CPT

## 2018-05-24 PROCEDURE — 71045 X-RAY EXAM CHEST 1 VIEW: CPT

## 2018-05-24 RX ADMIN — ONDANSETRON PRN MG: 4 TABLET, ORALLY DISINTEGRATING ORAL at 20:41

## 2018-05-24 RX ADMIN — FUROSEMIDE SCH MG: 10 INJECTION, SOLUTION INTRAMUSCULAR; INTRAVENOUS at 12:42

## 2018-05-24 SDOH — ECONOMIC STABILITY - HOUSING INSECURITY: HOMELESSNESS: Z59.0

## 2018-05-24 NOTE — HHI.HP
__________________________________________________





[facility]


Service


[facility]


Primary Care Physician


No Primary Care Physician


Admission Diagnosis





congestive heart failure


Diagnoses:  


(1) CHF (congestive heart failure)


Diagnosis:  Principal





Chief Complaint:  


sob


Travel History


International Travel<30 Days:  No


Contact w/Intl Traveler <30 Da:  No


Traveled to Known Affected Are:  No


History of Present Illness


patient is a 51 y/o male , homeless,with history of anxiety disorder, 

questionable angina,pneumothorax and liver lacerations presented to ER with 

shortness of breath.he says that he's had sob for the past one week. he has 

occasional productive cough of yellowish/ pinkish sputum. he doesn't report any 

fever . he says that he's had some pain to the lower chest which is worse with 

the cough. he has minimal to mild lower abdominal pain with no nausea or 

vomiting.he was seen in ER yesterday and was discharged on zithromax and 

albuterol with the impression of bronchitis. he says that his sob and cough got 

worse and he decided to come back to ER.





Review of Systems


Constitutional:  DENIES: Fever, Weight loss, Chills, Night Sweats


Eyes:  DENIES: Blurred vision, Diplopia, Vision loss, Double Vision


Ears, nose, mouth, throat:  DENIES: Tinnitus, Vertigo, Throat pain, Epistaxis


Respiratory:  COMPLAINS OF: Cough, Sputum production, Shortness of breath, 

DENIES: Apneas, Snoring, Wheezing, Hemoptysis


Cardiovascular:  DENIES: Chest pain, Palpitations, Syncope, Dyspnea on Exertion

, PND, Lower Extremity Edema, Orthopnea, Claudication


Gastrointestinal:  COMPLAINS OF: Abdominal pain, DENIES: Black stools, Bloody 

stools, Constipation, Diarrhea, Nausea, Vomiting, Difficulty Swallowing, 

Anorexia


Genitourinary:  DENIES: Urinary frequency, Urgency, Hematuria, Dysuria


Musculoskeletal:  DENIES: Joint pain, Muscle aches, Stiffness, Joint Swelling


Integumentary:  DENIES: Rash


Neurologic:  DENIES: Abnormal gait, Headache, Localized weakness, Paresthesias, 

Seizures, Speech Problems, Tremor, Poor Balance


Psychiatric:  DENIES: Anxiety, Confusion, Mood changes, Depression, 

Hallucinations, Agitation, Suicidal Ideation, Homicidal Ideation, Delusions





Past Family Social History


Past Medical History


anxiety disorder- says that he was told that ' he had angina' years ago.


Past Surgical History


chest tube placement


Reported Medications


none


Allergies:  


Coded Allergies:  


     No Known Allergies (Verified  Allergy, Unknown, 18)


Active Ordered Medications





Inpatient Medications


Albuterol Sulfate (Albuterol Neb) 1.25 mg Q2HR NEB  PRN NEB SHORTNESS OF BREATH

;  Start 18 at 09:45


Social History


smokes ten cigarettes a day- drinks twice weekly.





Physical Exam


Vital Signs





Vital Signs








  Date Time  Temp Pulse Resp B/P (MAP) Pulse Ox O2 Delivery O2 Flow Rate FiO2


 


18 08:10 97.9 97 16 119/83 (51) 95   








Physical Exam


GENERAL: This is a well-nourished, well-developed patient, in no apparent 

distress.


SKIN: No rashes, ecchymoses or lesions. Cool and dry.


HEAD: Atraumatic. Normocephalic. No temporal or scalp tenderness.


EYES: Pupils equal round and reactive. Extraocular motions intact. No scleral 

icterus. No injection or drainage. 


ENT: Nose without bleeding, purulent drainage or septal hematoma. Throat 

without erythema, tonsillar hypertrophy or exudate. Uvula midline. Airway 

patent.


NECK: Trachea midline. No JVD or lymphadenopathy. Supple, nontender, no 

meningeal signs.


CARDIOVASCULAR: Regular rate and rhythm without murmurs, gallops, or rubs. 


RESPIRATORY: Clear to auscultation. Breath sounds equal bilaterally. No wheezes

, rales, or rhonchi.  


GASTROINTESTINAL: Abdomen soft, non-tender, nondistended. No hepato-splenomegaly

, or palpable masses. No guarding.


MUSCULOSKELETAL: Extremities without clubbing, cyanosis, or edema. No joint 

tenderness, effusion, or edema noted. No calf tenderness. Negative Homans sign 

bilaterally.


NEUROLOGICAL: Awake and alert. Cranial nerves II through XII intact.  Motor and 

sensory grossly within normal limits. Five out of 5 muscle strength in all 

muscle groups.  Normal speech.


Laboratory





Laboratory Tests








Test


  18


08:19


 


White Blood Count 12.8 


 


Red Blood Count 4.33 


 


Hemoglobin 12.5 


 


Hematocrit 39.4 


 


Mean Corpuscular Volume 91.0 


 


Mean Corpuscular Hemoglobin 28.9 


 


Mean Corpuscular Hemoglobin


Concent 31.8 


 


 


Red Cell Distribution Width 16.6 


 


Platelet Count 182 


 


Mean Platelet Volume 8.7 


 


Neutrophils (%) (Auto) 83.3 


 


Lymphocytes (%) (Auto) 7.4 


 


Monocytes (%) (Auto) 8.2 


 


Eosinophils (%) (Auto) 0.4 


 


Basophils (%) (Auto) 0.7 


 


Neutrophils # (Auto) 10.7 


 


Lymphocytes # (Auto) 1.0 


 


Monocytes # (Auto) 1.1 


 


Eosinophils # (Auto) 0.1 


 


Basophils # (Auto) 0.1 


 


CBC Comment DIFF FINAL 


 


Differential Comment  


 


Blood Urea Nitrogen 23 


 


Creatinine 1.28 


 


Random Glucose 99 


 


Total Protein 6.6 


 


Albumin 3.0 


 


Calcium Level 8.4 


 


Alkaline Phosphatase 228 


 


Aspartate Amino Transf


(AST/SGOT) 156 


 


 


Alanine Aminotransferase


(ALT/SGPT) 939 


 


 


Total Bilirubin 0.7 


 


Sodium Level 136 


 


Potassium Level 4.6 


 


Chloride Level 103 


 


Carbon Dioxide Level 21.5 


 


Anion Gap 12 


 


Estimat Glomerular Filtration


Rate 59 


 


 


Troponin I 0.04 


 


B-Type Natriuretic Peptide 4562 








Result Diagram:  


18








Caprini VTE Risk Assessment


Caprini VTE Risk Assessment:  Mod/High Risk (score >= 2)


Caprini Risk Assessment Model











 Point Value = 1          Point Value = 2  Point Value = 3  Point Value = 5


 


Age 41-60


Minor surgery


BMI > 25 kg/m2


Swollen legs


Varicose veins


Pregnancy or postpartum


History of unexplained or recurrent


   spontaneous 


Oral contraceptives or hormone


   replacement


Sepsis (< 1 month)


Serious lung disease, including


   pneumonia (< 1 month)


Abnormal pulmonary function


Acute myocardial infarction


Congestive heart failure (< 1 month)


History of inflammatory bowel disease


Medical patient at bed rest Age 61-74


Arthroscopic surgery


Major open surgery (> 45 min)


Laparoscopic surgery (> 45 min)


Malignancy


Confined to bed (> 72 hours)


Immobilizing plaster cast


Central venous access Age >= 75


History of VTE


Family history of VTE


Factor V Leiden


Prothrombin 22219I


Lupus anticoagulant


Anticardiolipin antibodies


Elevated serum homocysteine


Heparin-induced thrombocytopenia


Other congenital or acquired


   thrombophilia Stroke (< 1 month)


Elective arthroplasty


Hip, pelvis, or leg fracture


Acute spinal cord injury (< 1 month)








Prophylaxis Regimen











   Total Risk


Factor Score Risk Level Prophylaxis Regimen


 


0-1      Low Early ambulation


 


2 Moderate Order ONE of the following:


*Sequential Compression Device (SCD)


*Heparin 5000 units SQ BID


 


3-4 Higher Order ONE of the following medications:


*Heparin 5000 units SQ TID


*Enoxaparin/Lovenox 40 mg SQ daily (WT < 150 kg, CrCl > 30 mL/min)


*Enoxaparin/Lovenox 30 mg SQ daily (WT < 150 kg, CrCl > 10-29 mL/min)


*Enoxaparin/Lovenox 30 mg SQ BID (WT < 150 kg, CrCl > 30 mL/min)


AND/OR


*Sequential Compression Device (SCD)


 


5 or more Highest Order ONE of the following medications:


*Heparin 5000 units SQ TID (Preferred with Epidurals)


*Enoxaparin/Lovenox 40 mg SQ daily (WT < 150 kg, CrCl > 30 mL/min)


*Enoxaparin/Lovenox 30 mg SQ daily (WT < 150 kg, CrCl > 10-29 mL/min)


*Enoxaparin/Lovenox 30 mg SQ BID (WT < 150 kg, CrCl > 30 mL/min)


AND


*Sequential Compression Device (SCD)











Assessment and Plan


Assessment and Plan


A/P  





-possible CHF ( with sob, and BNP of 4500/ cardiomegaly and pulmonary edema on 

CXR)


 start on IV diuretic and neb treatment as needed- check echo


-elevated LFT's- due to alcohol/ CHF


 will check the hepatitis panel and liver sonogram- will monitor the LFT's- 

consider GI evaluation pending the clinical course and work-up.


-case management consult for dc planning.


Discussed Condition With


ER physician and the patient.





Problem Qualifiers





(1) CHF (congestive heart failure):  








Lori Fuchs MD May 24, 2018 11:41

## 2018-05-24 NOTE — RADRPT
EXAM DATE:  5/24/2018 2:21 PM EDT

AGE/SEX:        52 years / Male



INDICATIONS:  Increased lab values. 



CLINICAL DATA:  This is the patient's initial encounter. Patient reports that signs and symptoms have
 been present for 1 day and indicates a pain score of 3/10. 

                                                                          

MEDICAL/SURGICAL HISTORY:       Hypertension. Irregular heartbeat. Bronchitis. Ulcers. Schizophrenia.
   . Elbow surgery. 



COMPARISON:      [facility] – [place], [place] ABDOMEN & PELVIS W CONTRAST, 7/12/2017.  .



MEASUREMENTS (cm x cm x cm):

Liver:__ 14.9 cm length

Common Bile Duct:__ 4mm

Right Kidney:__ 9.8 x 4.6 x 4.7 cm.



FINDINGS:

Liver: Normal echotexture without focal lesion or ductal dilatation. Trace ascites.

Portal Vein: Hepatopedal flow seen in portal vein.

Common Duct: No intraluminal mass or stone visualized.

Gallbladder:  Demonstrates no stone or pericholecystic fluid. No stones visualized. Gallbladder Wall:
 2 mm

Pancreas: The visualized portions are within normal limits

Right Kidney:  No mass or hydronephrosis

Other: Small bilateral pleural effusions.



CONCLUSION: 

1.  Trace ascites and small bilateral pleural effusions.

2.  Otherwise, unremarkable right upper quadrant ultrasound exam. Specifically, no sonographic eviden
ce for cholelithiasis or acute cholecystitis.



Electronically signed by: Jaime Espinoza MD  5/24/2018 2:38 PM EDT

## 2018-05-24 NOTE — PD
HPI


Chief Complaint:  General Weakness


Time Seen by Provider:  08:05


Travel History


International Travel<30 days:  No


Contact w/Intl Traveler<30days:  No





History of Present Illness


HPI


This is a 52-year-old male who is chronically homeless who presents to the 

emergency department with 5 days of productive cough, constant, moderate 

severity associated with some pink tinged sputum and fevers and chills.  He was 

seen overnight in the emergency department and diagnosed with bronchitis and 

discharged with albuterol.  At that time he had a chest x-ray demonstrating 

some concern for pulmonary edema.  He also was seen by me 2 days ago with 

involuntary movement in his legs.





PFSH


Past Medical History


Atrial Fibrillation:  Yes


Autoimmune Disease:  No


Anxiety:  Yes


Depression:  Yes


Heart Rhythm Problems:  Yes (A FIB)


Cardiovascular Problems:  Yes


Diabetes:  No


Diminished Hearing:  No


Gastrointestinal Disorders:  Yes


Genitourinary:  No


Hypertension:  Yes


Immune Disorder:  No


Musculoskeletal:  No


Neurologic:  No


Psychiatric:  Yes (STATES HE WAS DIAGNOSED WITH SCHIZOPHRENIA PREVIOUSLY BUT 

DOESNT TAKE MEDS)


Reproductive:  No


Respiratory:  Yes (BRONCHITIS)


Immunizations Current:  Yes


Seizures:  Yes


Ulcer:  Yes (GI ULCERS)





Past Surgical History


Endocrine Surgery:  No


Other Surgery:  Yes





Social History


Alcohol Use:  Yes (OCCASIONALLY)


Tobacco Use:  Yes (1/2 PPD)


Substance Use:  Yes (MARIJUANA SOMETIMES, denies IVDA)





Allergies-Medications


(Allergen,Severity, Reaction):  


Coded Allergies:  


     No Known Allergies (Verified  Allergy, Unknown, 5/24/18)


Reported Meds & Prescriptions





Reported Meds & Active Scripts


Active


Zithromax Z-Robin (Azithromycin) 250 Mg Dspk 250 Mg PO AS DIRECTED


     500 MG (2 tabs) day 1, then 1 tab days 2-5.


Ventolin Hfa 18 GM Inh (Albuterol Sulfate) 90 Mcg/Act Aer 1 Puff INH Q4H PRN


Klonopin (Clonazepam) 0.5 Mg Tab 0.5 Mg PO BID PRN








Review of Systems


Except as stated in HPI:  all other systems reviewed are Neg





Physical Exam


Narrative


GENERAL: Disheveled, no acute distress


SKIN: Focused skin assessment warm and dry.


HEAD: Atraumatic. Normocephalic. 


EYES: Pupils equal and round.  No injection or drainage. 


ENT:  Moist mucous membranes


NECK: Trachea midline. 


CARDIOVASCULAR: Coarse breath sounds in the bilateral bases, no tachypnea or 

increased work of breathing


RESPIRATORY: Clear to auscultation. Breath sounds equal bilaterally. 


GASTROINTESTINAL: Abdomen soft, non-tender, nondistended. 


MUSCULOSKELETAL: No obvious deformities. 


NEUROLOGICAL: Awake and alert. No obvious cranial nerve deficits.   Moving all 

extremities.


PSYCHIATRIC: Appropriate mood and affect; insight and judgment normal.





Data


Data


Last Documented VS





Vital Signs








  Date Time  Temp Pulse Resp B/P (MAP) Pulse Ox O2 Delivery O2 Flow Rate FiO2


 


5/24/18 08:10 97.9 97 16 119/83 (95) 95   








Orders





 Orders


Complete Blood Count With Diff (5/24/18 08:08)


Comprehensive Metabolic Panel (5/24/18 08:08)


B-Type Natriuretic Peptide (5/24/18 08:08)


Electrocardiogram (5/24/18 )


Troponin I (5/24/18 09:15)


Admit Order (Ed Use Only) (5/24/18 09:40)


Diet Heart Healthy (5/24/18 Breakfast)


Vital Signs (Adult) JESSENIA.Q4H (5/24/18 09:41)


Resp Oxygen Ismael C Titrat 1-4 L (5/24/18 )


Albuterol Neb (Albuterol Neb) (5/24/18 09:45)





Labs





Laboratory Tests








Test


  5/24/18


08:19


 


White Blood Count 12.8 TH/MM3 


 


Red Blood Count 4.33 MIL/MM3 


 


Hemoglobin 12.5 GM/DL 


 


Hematocrit 39.4 % 


 


Mean Corpuscular Volume 91.0 FL 


 


Mean Corpuscular Hemoglobin 28.9 PG 


 


Mean Corpuscular Hemoglobin


Concent 31.8 % 


 


 


Red Cell Distribution Width 16.6 % 


 


Platelet Count 182 TH/MM3 


 


Mean Platelet Volume 8.7 FL 


 


Neutrophils (%) (Auto) 83.3 % 


 


Lymphocytes (%) (Auto) 7.4 % 


 


Monocytes (%) (Auto) 8.2 % 


 


Eosinophils (%) (Auto) 0.4 % 


 


Basophils (%) (Auto) 0.7 % 


 


Neutrophils # (Auto) 10.7 TH/MM3 


 


Lymphocytes # (Auto) 1.0 TH/MM3 


 


Monocytes # (Auto) 1.1 TH/MM3 


 


Eosinophils # (Auto) 0.1 TH/MM3 


 


Basophils # (Auto) 0.1 TH/MM3 


 


CBC Comment DIFF FINAL 


 


Differential Comment  


 


Blood Urea Nitrogen 23 MG/DL 


 


Creatinine 1.28 MG/DL 


 


Random Glucose 99 MG/DL 


 


Total Protein 6.6 GM/DL 


 


Albumin 3.0 GM/DL 


 


Calcium Level 8.4 MG/DL 


 


Alkaline Phosphatase 228 U/L 


 


Aspartate Amino Transf


(AST/SGOT) 156 U/L 


 


 


Alanine Aminotransferase


(ALT/SGPT) 939 U/L 


 


 


Total Bilirubin 0.7 MG/DL 


 


Sodium Level 136 MEQ/L 


 


Potassium Level 4.6 MEQ/L 


 


Chloride Level 103 MEQ/L 


 


Carbon Dioxide Level 21.5 MEQ/L 


 


Anion Gap 12 MEQ/L 


 


Estimat Glomerular Filtration


Rate 59 ML/MIN 


 


 


Troponin I 0.04 NG/ML 


 


B-Type Natriuretic Peptide 4562 PG/ML 











MDM


Medical Decision Making


Medical Screen Exam Complete:  Yes


Emergency Medical Condition:  Yes


Interpretation(s)


EKG: Normal sinus rhythm, ST depression and T-wave inversions in the lateral 

leads, left ventricular hypertrophy


No leukocytosis


Transaminitis improved from prior


BNP is 4500


Chest x-ray from last evening demonstrates pulmonary edema


Differential Diagnosis


Pulmonary embolism, congestive heart failure, tuberculosis, pneumonia, 

bronchitis


Narrative Course


This is a 52-year-old male who has a history of alcoholism and homelessness who 

presents to the emergency department with increasing shortness of breath and 

productive cough with pink tinged sputum.  Patient had a chest x-ray last 

evening which is suspicious for pulmonary edema.  BNP is 4500 consistent with 

congestive heart failure.  Patient will be admitted as this is a new diagnosis.

  He has no chest pain to suggest an acute myocardial infarction.





Physician Communication


Physician Communication


Discussed with Dr. De Paz





Diagnosis





 Primary Impression:  


 CHF (congestive heart failure)


 Qualified Codes:  I50.9 - Heart failure, unspecified





Admitting Information


Admitting Physician Requests:  Kesha Medina MD May 24, 2018 08:13

## 2018-05-25 VITALS
RESPIRATION RATE: 20 BRPM | SYSTOLIC BLOOD PRESSURE: 132 MMHG | DIASTOLIC BLOOD PRESSURE: 86 MMHG | HEART RATE: 94 BPM | OXYGEN SATURATION: 95 % | TEMPERATURE: 99.4 F

## 2018-05-25 VITALS
SYSTOLIC BLOOD PRESSURE: 120 MMHG | DIASTOLIC BLOOD PRESSURE: 62 MMHG | TEMPERATURE: 98.2 F | HEART RATE: 62 BPM | RESPIRATION RATE: 20 BRPM | OXYGEN SATURATION: 98 %

## 2018-05-25 VITALS
TEMPERATURE: 98 F | SYSTOLIC BLOOD PRESSURE: 120 MMHG | DIASTOLIC BLOOD PRESSURE: 94 MMHG | HEART RATE: 98 BPM | OXYGEN SATURATION: 97 % | RESPIRATION RATE: 20 BRPM

## 2018-05-25 VITALS
HEART RATE: 100 BPM | OXYGEN SATURATION: 96 % | DIASTOLIC BLOOD PRESSURE: 84 MMHG | RESPIRATION RATE: 16 BRPM | TEMPERATURE: 98.7 F | SYSTOLIC BLOOD PRESSURE: 118 MMHG

## 2018-05-25 VITALS
TEMPERATURE: 98.2 F | SYSTOLIC BLOOD PRESSURE: 126 MMHG | HEART RATE: 80 BPM | RESPIRATION RATE: 20 BRPM | DIASTOLIC BLOOD PRESSURE: 80 MMHG | OXYGEN SATURATION: 98 %

## 2018-05-25 VITALS
DIASTOLIC BLOOD PRESSURE: 68 MMHG | OXYGEN SATURATION: 98 % | HEART RATE: 68 BPM | TEMPERATURE: 98.2 F | SYSTOLIC BLOOD PRESSURE: 128 MMHG | RESPIRATION RATE: 20 BRPM

## 2018-05-25 VITALS — OXYGEN SATURATION: 94 %

## 2018-05-25 LAB
ALBUMIN SERPL-MCNC: 2.4 GM/DL (ref 3.4–5)
ALP SERPL-CCNC: 173 U/L (ref 45–117)
ALT SERPL-CCNC: 551 U/L (ref 12–78)
AST SERPL-CCNC: 67 U/L (ref 15–37)
BASOPHILS # BLD AUTO: 0 TH/MM3 (ref 0–0.2)
BASOPHILS NFR BLD: 0.3 % (ref 0–2)
BILIRUB SERPL-MCNC: 0.7 MG/DL (ref 0.2–1)
BUN SERPL-MCNC: 24 MG/DL (ref 7–18)
CALCIUM SERPL-MCNC: 8.1 MG/DL (ref 8.5–10.1)
CHLORIDE SERPL-SCNC: 104 MEQ/L (ref 98–107)
CREAT SERPL-MCNC: 1.15 MG/DL (ref 0.6–1.3)
EOSINOPHIL # BLD: 0.1 TH/MM3 (ref 0–0.4)
EOSINOPHIL NFR BLD: 0.9 % (ref 0–4)
ERYTHROCYTE [DISTWIDTH] IN BLOOD BY AUTOMATED COUNT: 16.5 % (ref 11.6–17.2)
GFR SERPLBLD BASED ON 1.73 SQ M-ARVRAT: 67 ML/MIN (ref 89–?)
GLUCOSE SERPL-MCNC: 91 MG/DL (ref 74–106)
HCO3 BLD-SCNC: 24.6 MEQ/L (ref 21–32)
HCT VFR BLD CALC: 37.1 % (ref 39–51)
HGB BLD-MCNC: 11.9 GM/DL (ref 13–17)
INR PPP: 1.2 RATIO
LYMPHOCYTES # BLD AUTO: 0.8 TH/MM3 (ref 1–4.8)
LYMPHOCYTES NFR BLD AUTO: 6.5 % (ref 9–44)
MCH RBC QN AUTO: 28.9 PG (ref 27–34)
MCHC RBC AUTO-ENTMCNC: 32.1 % (ref 32–36)
MCV RBC AUTO: 90 FL (ref 80–100)
MONOCYTE #: 1.3 TH/MM3 (ref 0–0.9)
MONOCYTES NFR BLD: 10.2 % (ref 0–8)
NEUTROPHILS # BLD AUTO: 10.3 TH/MM3 (ref 1.8–7.7)
NEUTROPHILS NFR BLD AUTO: 82.1 % (ref 16–70)
PLATELET # BLD: 172 TH/MM3 (ref 150–450)
PMV BLD AUTO: 9.4 FL (ref 7–11)
PROT SERPL-MCNC: 5.3 GM/DL (ref 6.4–8.2)
PROTHROMBIN TIME: 12.5 SEC (ref 9.8–11.6)
RBC # BLD AUTO: 4.12 MIL/MM3 (ref 4.5–5.9)
SODIUM SERPL-SCNC: 139 MEQ/L (ref 136–145)
WBC # BLD AUTO: 12.5 TH/MM3 (ref 4–11)

## 2018-05-25 RX ADMIN — AZITHROMYCIN SCH MG: 250 TABLET, FILM COATED ORAL at 16:46

## 2018-05-25 RX ADMIN — STANDARDIZED SENNA CONCENTRATE AND DOCUSATE SODIUM SCH TAB: 8.6; 5 TABLET, FILM COATED ORAL at 16:47

## 2018-05-25 RX ADMIN — ONDANSETRON PRN MG: 4 TABLET, ORALLY DISINTEGRATING ORAL at 13:13

## 2018-05-25 RX ADMIN — Medication SCH ML: at 21:26

## 2018-05-25 RX ADMIN — GUAIFENESIN SCH MG: 600 TABLET, EXTENDED RELEASE ORAL at 21:26

## 2018-05-25 RX ADMIN — FAMOTIDINE SCH MG: 20 TABLET, FILM COATED ORAL at 16:46

## 2018-05-25 RX ADMIN — STANDARDIZED SENNA CONCENTRATE AND DOCUSATE SODIUM SCH TAB: 8.6; 5 TABLET, FILM COATED ORAL at 21:26

## 2018-05-25 RX ADMIN — POTASSIUM CHLORIDE SCH MEQ: 20 TABLET, EXTENDED RELEASE ORAL at 09:05

## 2018-05-25 RX ADMIN — FUROSEMIDE SCH MG: 10 INJECTION, SOLUTION INTRAMUSCULAR; INTRAVENOUS at 09:05

## 2018-05-25 RX ADMIN — GUAIFENESIN SCH MG: 600 TABLET, EXTENDED RELEASE ORAL at 16:46

## 2018-05-25 RX ADMIN — FAMOTIDINE SCH MG: 20 TABLET, FILM COATED ORAL at 21:26

## 2018-05-25 NOTE — EKG
Date Performed: 05/24/2018       Time Performed: 10:15:01

 

PTAGE:      52 years

 

EKG:      SINUS TACHYCARDIA WITH SHORT MI INTERVAL POSSIBLE LEFT ATRIAL ENLARGEMENT POSSIBLE LEFT SARA
TRICULAR HYPERTROPHY ST DEVIATION AND MODERATE T-WAVE ABNORMALITY, CONSIDER LATERAL ISCHEMIA ABNORMAL
 ECG

 

PREVIOUS TRACING       : 09/20/2017 20.42

 

DOCTOR:   Christa French  Interpretating Date/Time  05/25/2018 07:19:25

## 2018-05-25 NOTE — HHI.PR
Subjective


Remarks


f/u possible CHF.  States his dyspnea is improving as well as hemoptysis which 

he described as white phlegm admixed with blood denies chest pain.  Requesting 

oxycodone for his back pain.  He also has heartburn..





Objective


Vitals





Vital Signs








  Date Time  Temp Pulse Resp B/P (MAP) Pulse Ox O2 Delivery O2 Flow Rate FiO2


 


5/25/18 12:51   18     


 


5/25/18 12:29 98.2 62 20 120/62 (81) 98   


 


5/25/18 09:07     94   21


 


5/25/18 08:05 98.2 80 20 126/80 (95) 98   


 


5/25/18 04:37 98.7 100 16 118/84 (95) 96   


 


5/24/18 23:11 98.3 97 18 116/73 (87) 92   


 


5/24/18 20:11 98.4 104 17 119/71 (87) 95   


 


5/24/18 16:00 96.8 101 18 122/89 (100) 96   














I/O      


 


 5/24/18 5/24/18 5/24/18 5/25/18 5/25/18 5/25/18





 07:00 15:00 23:00 07:00 15:00 23:00


 


Intake Total   680 ml 200 ml  


 


Balance   680 ml 200 ml  


 


      


 


Intake Oral   680 ml 200 ml  


 


# Voids   1   








Result Diagram:  


5/25/18 0630                                                                   

             5/25/18 0630





Imaging





Last Impressions








Liver Ultrasound 5/24/18 0000 Signed





Impressions: 





 CONCLUSION: 





 1.  Trace ascites and small bilateral pleural effusions.





 2.  Otherwise, unremarkable right upper quadrant ultrasound exam. Specifically,





  no sonographic evidence for cholelithiasis or acute cholecystitis.





  





 








Objective Remarks


GENERAL: This is a well-nourished, well-developed patient, in no apparent 

distress.


SKIN: No rashes, ecchymoses or lesions. Cool and dry.


CARDIOVASCULAR: Regular rate and rhythm without murmurs, gallops, or rubs. 


RESPIRATORY: Clear to auscultation. Breath sounds equal bilaterally. No wheezes

, rales, or rhonchi.  


GASTROINTESTINAL: Abdomen soft, non-tender, nondistended.  No guarding.


MUSCULOSKELETAL: Extremities without clubbing, cyanosis, or edema. No joint 

tenderness, effusion, or edema noted. No calf tenderness. Negative Homans sign 

bilaterally.


NEUROLOGICAL: Awake and alert. Cranial nerves II through XII intact.  Motor and 

sensory grossly within normal limits. Five out of 5 muscle strength in all 

muscle groups.  Normal speech.


Procedures


none





A/P


Problem List:  


(1) CHF (congestive heart failure)


ICD Code:  I50.9 - Heart failure, unspecified


Assessment and Plan


Possible CHF ( with sob, and BNP of 4500/ cardiomegaly and pulmonary edema on 

CXR).  Improving continue diuresis and follow-up echocardiogram.  CHF education

, I/O and monitor weight.  Monitor renal function and electrolytes.


elevated LFT's- due to alcohol/ CHF.  Negative hepatitis profile.  Liver 

sonogram noted.  More monitor


Bronchitis.  Continue Zithromax and nebulization.


Heartburn.  On telemetry reflux mechanics are discussed with the patient start 

PPI and antacids


Chronic low back pain.  Symptomatic treatment


DVT prophylaxis with SCD and early ambulation





Problem Qualifiers





(1) CHF (congestive heart failure):  


Qualified Codes:  I50.9 - Heart failure, unspecified








Zion Stone MD May 25, 2018 14:55

## 2018-05-26 VITALS
OXYGEN SATURATION: 93 % | RESPIRATION RATE: 20 BRPM | SYSTOLIC BLOOD PRESSURE: 105 MMHG | TEMPERATURE: 98.8 F | DIASTOLIC BLOOD PRESSURE: 69 MMHG | HEART RATE: 86 BPM

## 2018-05-26 VITALS — SYSTOLIC BLOOD PRESSURE: 114 MMHG | DIASTOLIC BLOOD PRESSURE: 76 MMHG | HEART RATE: 91 BPM

## 2018-05-26 VITALS
OXYGEN SATURATION: 91 % | RESPIRATION RATE: 18 BRPM | HEART RATE: 88 BPM | SYSTOLIC BLOOD PRESSURE: 112 MMHG | TEMPERATURE: 97.4 F | DIASTOLIC BLOOD PRESSURE: 78 MMHG

## 2018-05-26 VITALS
OXYGEN SATURATION: 95 % | TEMPERATURE: 98.1 F | HEART RATE: 78 BPM | SYSTOLIC BLOOD PRESSURE: 104 MMHG | RESPIRATION RATE: 16 BRPM | DIASTOLIC BLOOD PRESSURE: 67 MMHG

## 2018-05-26 VITALS — HEART RATE: 85 BPM

## 2018-05-26 VITALS
RESPIRATION RATE: 20 BRPM | TEMPERATURE: 99 F | HEART RATE: 95 BPM | SYSTOLIC BLOOD PRESSURE: 112 MMHG | DIASTOLIC BLOOD PRESSURE: 72 MMHG | OXYGEN SATURATION: 97 %

## 2018-05-26 VITALS — HEART RATE: 83 BPM

## 2018-05-26 VITALS
DIASTOLIC BLOOD PRESSURE: 71 MMHG | TEMPERATURE: 98.7 F | RESPIRATION RATE: 18 BRPM | SYSTOLIC BLOOD PRESSURE: 105 MMHG | HEART RATE: 91 BPM | OXYGEN SATURATION: 94 %

## 2018-05-26 VITALS
RESPIRATION RATE: 16 BRPM | DIASTOLIC BLOOD PRESSURE: 78 MMHG | OXYGEN SATURATION: 96 % | SYSTOLIC BLOOD PRESSURE: 110 MMHG | TEMPERATURE: 98 F | HEART RATE: 90 BPM

## 2018-05-26 VITALS — OXYGEN SATURATION: 96 %

## 2018-05-26 LAB
BASOPHILS # BLD AUTO: 0 TH/MM3 (ref 0–0.2)
BASOPHILS NFR BLD: 0.1 % (ref 0–2)
BUN SERPL-MCNC: 21 MG/DL (ref 7–18)
CALCIUM SERPL-MCNC: 8.1 MG/DL (ref 8.5–10.1)
CHLORIDE SERPL-SCNC: 103 MEQ/L (ref 98–107)
CREAT SERPL-MCNC: 0.94 MG/DL (ref 0.6–1.3)
EOSINOPHIL # BLD: 0.1 TH/MM3 (ref 0–0.4)
EOSINOPHIL NFR BLD: 0.6 % (ref 0–4)
ERYTHROCYTE [DISTWIDTH] IN BLOOD BY AUTOMATED COUNT: 16 % (ref 11.6–17.2)
ERYTHROCYTE [DISTWIDTH] IN BLOOD BY AUTOMATED COUNT: 16.3 % (ref 11.6–17.2)
GFR SERPLBLD BASED ON 1.73 SQ M-ARVRAT: 85 ML/MIN (ref 89–?)
GLUCOSE SERPL-MCNC: 77 MG/DL (ref 74–106)
HCO3 BLD-SCNC: 23.5 MEQ/L (ref 21–32)
HCT VFR BLD CALC: 38.7 % (ref 39–51)
HCT VFR BLD CALC: 42.9 % (ref 39–51)
HGB BLD-MCNC: 12.6 GM/DL (ref 13–17)
HGB BLD-MCNC: 13.9 GM/DL (ref 13–17)
INR PPP: 1.2 RATIO
LYMPHOCYTES # BLD AUTO: 0.9 TH/MM3 (ref 1–4.8)
LYMPHOCYTES NFR BLD AUTO: 6.5 % (ref 9–44)
MAGNESIUM SERPL-MCNC: 2.1 MG/DL (ref 1.5–2.5)
MCH RBC QN AUTO: 29 PG (ref 27–34)
MCH RBC QN AUTO: 29.2 PG (ref 27–34)
MCHC RBC AUTO-ENTMCNC: 32.3 % (ref 32–36)
MCHC RBC AUTO-ENTMCNC: 32.5 % (ref 32–36)
MCV RBC AUTO: 89.3 FL (ref 80–100)
MCV RBC AUTO: 90.3 FL (ref 80–100)
MONOCYTE #: 1.5 TH/MM3 (ref 0–0.9)
MONOCYTES NFR BLD: 10.9 % (ref 0–8)
NEUTROPHILS # BLD AUTO: 11 TH/MM3 (ref 1.8–7.7)
NEUTROPHILS NFR BLD AUTO: 81.9 % (ref 16–70)
PLATELET # BLD: 187 TH/MM3 (ref 150–450)
PLATELET # BLD: 234 TH/MM3 (ref 150–450)
PMV BLD AUTO: 8.8 FL (ref 7–11)
PMV BLD AUTO: 9.6 FL (ref 7–11)
PROTHROMBIN TIME: 11.8 SEC (ref 9.8–11.6)
RBC # BLD AUTO: 4.34 MIL/MM3 (ref 4.5–5.9)
RBC # BLD AUTO: 4.75 MIL/MM3 (ref 4.5–5.9)
SODIUM SERPL-SCNC: 135 MEQ/L (ref 136–145)
WBC # BLD AUTO: 12.6 TH/MM3 (ref 4–11)
WBC # BLD AUTO: 13.4 TH/MM3 (ref 4–11)

## 2018-05-26 RX ADMIN — AZITHROMYCIN SCH MG: 250 TABLET, FILM COATED ORAL at 08:57

## 2018-05-26 RX ADMIN — STANDARDIZED SENNA CONCENTRATE AND DOCUSATE SODIUM SCH TAB: 8.6; 5 TABLET, FILM COATED ORAL at 20:59

## 2018-05-26 RX ADMIN — Medication SCH ML: at 20:51

## 2018-05-26 RX ADMIN — HEPARIN SODIUM PRN MLS/HR: 10000 INJECTION, SOLUTION INTRAVENOUS at 15:16

## 2018-05-26 RX ADMIN — GUAIFENESIN SCH MG: 600 TABLET, EXTENDED RELEASE ORAL at 08:56

## 2018-05-26 RX ADMIN — FUROSEMIDE SCH MG: 10 INJECTION, SOLUTION INTRAMUSCULAR; INTRAVENOUS at 08:56

## 2018-05-26 RX ADMIN — STANDARDIZED SENNA CONCENTRATE AND DOCUSATE SODIUM SCH TAB: 8.6; 5 TABLET, FILM COATED ORAL at 08:57

## 2018-05-26 RX ADMIN — CARVEDILOL SCH MG: 3.12 TABLET, FILM COATED ORAL at 15:12

## 2018-05-26 RX ADMIN — FAMOTIDINE SCH MG: 20 TABLET, FILM COATED ORAL at 20:50

## 2018-05-26 RX ADMIN — WARFARIN SODIUM SCH MG: 5 TABLET ORAL at 15:11

## 2018-05-26 RX ADMIN — GUAIFENESIN SCH MG: 600 TABLET, EXTENDED RELEASE ORAL at 20:49

## 2018-05-26 RX ADMIN — FAMOTIDINE SCH MG: 20 TABLET, FILM COATED ORAL at 08:56

## 2018-05-26 RX ADMIN — POTASSIUM CHLORIDE SCH MEQ: 20 TABLET, EXTENDED RELEASE ORAL at 08:56

## 2018-05-26 RX ADMIN — Medication SCH ML: at 08:56

## 2018-05-26 RX ADMIN — CARVEDILOL SCH MG: 3.12 TABLET, FILM COATED ORAL at 20:50

## 2018-05-26 NOTE — HHI.PR
Subjective


Remarks


Follow-up bronchitis and heart failure.  Improving hemoptysis.  He feels good 

patient.  Patient advised results of echocardiogram.  CHF education, I/monitor 

weight.  Agrees with anticoagulation





Objective


Vitals





Vital Signs








  Date Time  Temp Pulse Resp B/P (MAP) Pulse Ox O2 Delivery O2 Flow Rate FiO2


 


5/26/18 08:48 97.4 88 18 112/78 (89) 91   


 


5/26/18 05:00   16     


 


5/26/18 04:01 99.0 95 20 112/72 (85) 97   


 


5/25/18 23:44 99.4 94 20 132/86 (101) 95   


 


5/25/18 21:00 98.0 98 20 120/94 (103) 97   


 


5/25/18 16:36 98.2 68 20 128/68 (88) 98   


 


5/25/18 12:51   18     


 


5/25/18 12:29 98.2 62 20 120/62 (81) 98   














I/O      


 


 5/25/18 5/25/18 5/25/18 5/26/18 5/26/18 5/26/18





 07:00 15:00 23:00 07:00 15:00 23:00


 


Intake Total 200 ml     


 


Balance 200 ml     


 


      


 


Intake Oral 200 ml     








Result Diagram:  


5/26/18 0750                                                                   

             5/26/18 0750





Imaging





Last Impressions








Liver Ultrasound 5/24/18 0000 Signed





Impressions: 





 CONCLUSION: 





 1.  Trace ascites and small bilateral pleural effusions.





 2.  Otherwise, unremarkable right upper quadrant ultrasound exam. Specifically,





  no sonographic evidence for cholelithiasis or acute cholecystitis.





  





 








Objective Remarks


GENERAL: This is a well-nourished, well-developed patient, in no apparent 

distress.


SKIN: No rashes, ecchymoses or lesions. Cool and dry.


CARDIOVASCULAR: Regular rate and rhythm without murmurs, gallops, or rubs. 


RESPIRATORY: Clear to auscultation. Breath sounds equal bilaterally. No wheezes

, rales, or rhonchi.  


GASTROINTESTINAL: Abdomen soft, non-tender, nondistended.  No guarding.


MUSCULOSKELETAL: Extremities without clubbing, cyanosis, or edema. No joint 

tenderness, effusion, or edema noted. No calf tenderness. Negative Homans sign 

bilaterally.


NEUROLOGICAL: Awake and alert. Cranial nerves II through XII intact.  Motor and 

sensory grossly within normal limits. Five out of 5 muscle strength in all 

muscle groups.  Normal speech.


Procedures


none





A/P


Problem List:  


(1) CHF (congestive heart failure)


ICD Code:  I50.9 - Heart failure, unspecified


Assessment and Plan


Acute systolic CHF ( with sob, and BNP of 4500/ cardiomegaly and pulmonary 

edema on CXR).  Improving continue diuresis and start Coreg.  Consider ACE 

inhibitor.  CHF education, I/O and monitor weight.  Monitor renal function and 

electrolytes.  Consult cardiology.


Apical thrombus secondary to low ejection fraction.  Start anticoagulation with 

heparin drip.  Start Coumadin if okay with cardiology patient educated


elevated LFT's- due to alcohol/ CHF.  Negative hepatitis profile.  Liver 

sonogram noted.  Monitor


Bronchitis.  Continue Zithromax and nebulization.


Heartburn.  On telemetry reflux mechanics are discussed with the patient start 

PPI and antacids


Chronic low back pain.  Symptomatic treatment


DVT prophylaxis with SCD and early ambulation





Problem Qualifiers





(1) CHF (congestive heart failure):  


Qualified Codes:  I50.9 - Heart failure, unspecified








Zion Stone MD May 26, 2018 11:04

## 2018-05-26 NOTE — MB
cc:

Tavo Gann MD

****

 

 

DATE:

05/26/2018

 

REASON FOR CONSULTATION:

Severe dilated cardiomyopathy, left ventricular apical thrombus.

 

HISTORY OF PRESENT ILLNESS:

The patient is a 52-year-old white male with no major past medical 

history, although with prior history of alcoholism, opioid addiction 

and tobacco abuse, who presented to the hospital with an approximately

1 week history of increasing shortness of breath, paroxysmal nocturnal

dyspnea and mild ankle edema.  He was found to have an ejection 

fraction of less than 15% by echo as well as left ventricular apical 

thrombus.  He denies palpitations or syncope, but has had intermittent

moderate lightheadedness.  He denies orthopnea, fevers, flu illnesses 

and cocaine abuse.  Since coming into the hospital, his dyspnea has 

considerably improved.  Two weeks ago her reports "sharp" left parasternal

chest pains, lasting a few minutes, with no associated nausea, diaphoresis,

or increased dyspnea, with no relationship whatsoever to exertion.

 

PAST MEDICAL HISTORY:

None.

 

CARDIAC MEDICATIONS:

1.  At home:  None.

2.  Here in the hospital, he has been placed on carvedilol 3.125 mg 

p.o. b.i.d., warfarin 5 mg p.o. at bedtime, Heparin drip and 

Furosemide 20 mg p.o. daily.

 

ALLERGIES:

NO KNOWN DRUG ALLERGIES.

 

FAMILY HISTORY:

The patient believes both his brother and father sustained myocardial 

infarctions in their 50s.

 

SOCIAL HISTORY:

The patient smokes about 10 cigarettes per day.  He drinks occasional 

alcohol, approximately twice a week.  The patient admits to 

polysubstance drug abuse in the past, but none for the last 7 years.

 

REVIEW OF SYSTEMS:

As in the history of present illness, otherwise negative or 

noncontributory.  He also currently denies headache, abdominal pain, 

melena, and bright red blood per rectum.

 

PHYSICAL EXAMINATION:

VITAL SIGNS:  Blood pressure 114/76 with a pulse of 90, respirations 

15.

GENERAL:  He is a well-developed, thin white male, in no acute 

distress.

NECK:  Jugular venous pressure is normal.  Carotid pulses are 2+ 

bilaterally without bruits.

CHEST:  Reveals diminished breath sounds at the bases.

CARDIAC:  He has a regular rhythm and rate with an S1, S2, S3.  No 

definite murmur is audible.

ABDOMEN:  He has a soft, nontender abdomen.  Bowel sounds are present.

 There is no definite hepatosplenomegaly.

EXTREMITIES:  Reveals no clubbing, cyanosis or edema.

 

LABORATORY DATA:

Includes WBC 12.6, hemoglobin 13.9, platelets 234.  Potassium 5.0, BUN

21, creatinine 0.94, AST 67, .  Brain natriuretic peptide level

4562, INR 1.2.

 

IMAGING STUDIES:

Chest x-ray shows mild pulmonary edema.

 

EKG shows sinus tachycardia, voltage criteria for left ventricular 

hypertrophy, nonspecific T-wave abnormalities.

 

IMPRESSION:

Severe dilated cardiomyopathy with ejection fraction of 10-15%, left 

ventricular apical thrombus, congestive heart failure in this 

52-year-old white male with a history of polysubstance abuse including

alcohol abuse.  Since coming into the hospital, he has symptomatically

improved.  Most likely his cardiomyopathy is nonischemic in origin, 

possibly from alcohol, possibly viral induced.  His recent chest pains

2 weeks ago are extremely atypical for myocardial ischemia.

 

RECOMMENDATIONS:

1.  Agree with beta blocker therapy.  Try to add low-dose ACE 

inhibitor.

2.  Agree with warfarin.  The patient states he will be compliant with

followup with some physician, hopefully in a [facility].

 

 

__________________________________

MD DEMARIO Ashley/JOSE

D: 05/26/2018, 03:42 PM

T: 05/26/2018, 04:05 PM

Visit #: [id number]

Job #: [id number]

[email or web address]

## 2018-05-26 NOTE — ECHRPT
Indication:   HEART FAILURE 

 

 CONCLUSIONS 

 Moderately dilated left ventricle. Wall thickness is normal.  

 The left ventricular systolic function is severely reduced with an estimated ejection fraction 10-15
%.   The  

 basal posterior and basal lateral walls are mildly hypokinetic; all other segments are severely hypo
kinetic.   

 In addition, there is a 1.6 x 1.9 cm ovoid echodensity at the apex consistent with apical thrombus. 


 

 Normal right ventricular size with probably mildly reduced systolic function.   

 

 The left atrial size is mildly dilated.  

 

 Trace-to-mild mitral valve regurgitation.  

 

 Trace aortic valve regurgitation. 

 

 There is trace tricuspid valve regurgitation.  

 The estimated pulmonary arterial pressure is 35 mmHg.  

 

 

 BP:        /         HR:                          Rhythm:           Sinus 

 

 MEASUREMENTS  (Male / Female) Normal Values       Technical Quality:Good 

 2D ECHO 

 LV Diastolic Diameter PLAX        6.4 cm                4.2 - 5.9 / 3.9 - 5.3 cm 

 LV Systolic Diameter PLAX         5.9 cm                 

 IVS Diastolic Thickness           0.9 cm                0.6 - 1.0 / 0.6 - 0.9 cm 

 LVPW Diastolic Thickness          0.9 cm                0.6 - 1.0 / 0.6 - 0.9 cm 

 LV Relative Wall Thickness        0.3                    

 RV Internal Dim ED PLAX           2.4 cm                 

 LVOT Diameter                     2.2 cm                 

 LA Systolic Diameter LX           4.0 cm                3.0 - 4.0 / 2.7 - 3.8 cm 

 LV Ejection Fraction MOD 4C       24.3 %                 

 LV Ejection Fraction 4C AL        25.6 %                 

 

 M-MODE 

 Aortic Root Diameter MM           2.4 cm                 

 LA Systolic Diameter MM           4.0 cm                 

 LA Ao Ratio MM                    1.7                    

 AV Cusp Separation MM             2.0 cm                 

 

 DOPPLER 

 AV Peak Velocity                  91.9 cm/s              

 AV Peak Gradient                  3.4 mmHg               

 LVOT Peak Velocity                57.8 cm/s              

 LVOT Peak Gradient                1.3 mmHg               

 AV Area Cont Eq pk                2.4 cm                

 MV Area PHT                       4.1 cm                

 Mitral E Point Velocity           57.8 cm/s              

 Mitral A Point Velocity           35.0 cm/s              

 Mitral E to A Ratio               1.7                    

 LV E' Lateral Velocity            4.0 cm/s               

 Mitral E to LV E' Lateral Ratio   14.4                   

 LV E' Septal Velocity             5.3 cm/s               

 Mitral E to LV E' Septal Ratio    11.0                   

 TR Peak Velocity                  251.0 cm/s             

 TR Peak Gradient                  25.2 mmHg              

 Right Atrial Pressure             10.0 mmHg              

 Pulmonary Artery Systolic Pressu  35.2 mmHg              

 Right Ventricular Systolic Press  35.2 mmHg              

 PV Peak Velocity                  74.7 cm/s              

 PV Peak Gradient                  2.2 mmHg               

 

 

 FINDINGS 

 

 LEFT VENTRICLE 

 Moderately dilated left ventricle. Wall thickness is normal.  

 The left ventricular systolic function is severely reduced with an estimated ejection fraction 10-15
%.   The  

 basal posterior and basal lateral walls are mildly hypokinetic; all other segments are severely hypo
kinetic.   

 In addition, there is a 1.6 x 1.9 cm ovoid echodensity at the apex consistent with apical thrombus. 


 

 RIGHT VENTRICLE 

 Normal right ventricular size with probably mildly reduced systolic function.   

 

 LEFT ATRIUM 

 The left atrial size is mildly dilated.  

 

 

 RIGHT ATRIUM 

 The right atrial size is normal.   

 

 ATRIAL SEPTUM 

 Normal atrial septal thickness without atrial level shunting by limited color doppler interrogation.
   

 

 AORTA 

 The aortic root and proximal ascending aorta are normal in size on limited imaging.   

 

 MITRAL VALVE 

 Structurally normal mitral valve.  

 Trace-to-mild mitral valve regurgitation.  

 

 AORTIC VALVE 

 Trileaflet aortic valve.  

 Trace aortic valve regurgitation.  

 

 TRICUSPID VALVE 

 Structurally normal tricuspid valve.  

 There is trace tricuspid valve regurgitation.  

 The estimated pulmonary arterial pressure is 35 mmHg.  

 

 PULMONARY VALVE 

 Trivial pulmonary valve regurgitation.  

 

 VESSELS 

 The inferior vena cava is normal in size.   

 

 PERICARDIUM 

 No pericardial effusion.   

 

 

 

 

  Tavo Gann MD 

  (Electronically Signed) 

  Final Date:26 May 2018 12:20

## 2018-05-27 VITALS
OXYGEN SATURATION: 95 % | HEART RATE: 89 BPM | DIASTOLIC BLOOD PRESSURE: 59 MMHG | SYSTOLIC BLOOD PRESSURE: 99 MMHG | TEMPERATURE: 98.3 F | RESPIRATION RATE: 18 BRPM

## 2018-05-27 VITALS
TEMPERATURE: 97.5 F | HEART RATE: 80 BPM | DIASTOLIC BLOOD PRESSURE: 63 MMHG | SYSTOLIC BLOOD PRESSURE: 106 MMHG | RESPIRATION RATE: 20 BRPM | OXYGEN SATURATION: 93 %

## 2018-05-27 VITALS
SYSTOLIC BLOOD PRESSURE: 112 MMHG | OXYGEN SATURATION: 96 % | TEMPERATURE: 98.8 F | HEART RATE: 89 BPM | RESPIRATION RATE: 16 BRPM | DIASTOLIC BLOOD PRESSURE: 72 MMHG

## 2018-05-27 VITALS — HEART RATE: 72 BPM

## 2018-05-27 VITALS
DIASTOLIC BLOOD PRESSURE: 72 MMHG | SYSTOLIC BLOOD PRESSURE: 104 MMHG | OXYGEN SATURATION: 98 % | HEART RATE: 88 BPM | TEMPERATURE: 98.4 F | RESPIRATION RATE: 17 BRPM

## 2018-05-27 VITALS
HEART RATE: 82 BPM | TEMPERATURE: 99.1 F | OXYGEN SATURATION: 94 % | SYSTOLIC BLOOD PRESSURE: 104 MMHG | DIASTOLIC BLOOD PRESSURE: 75 MMHG | RESPIRATION RATE: 17 BRPM

## 2018-05-27 VITALS — HEART RATE: 89 BPM

## 2018-05-27 VITALS
SYSTOLIC BLOOD PRESSURE: 103 MMHG | HEART RATE: 79 BPM | DIASTOLIC BLOOD PRESSURE: 65 MMHG | RESPIRATION RATE: 20 BRPM | TEMPERATURE: 97.7 F | OXYGEN SATURATION: 96 %

## 2018-05-27 VITALS — HEART RATE: 85 BPM

## 2018-05-27 LAB
BASOPHILS # BLD AUTO: 0.1 TH/MM3 (ref 0–0.2)
BASOPHILS NFR BLD: 0.5 % (ref 0–2)
BUN SERPL-MCNC: 23 MG/DL (ref 7–18)
CALCIUM SERPL-MCNC: 8.1 MG/DL (ref 8.5–10.1)
CHLORIDE SERPL-SCNC: 99 MEQ/L (ref 98–107)
CREAT SERPL-MCNC: 1.07 MG/DL (ref 0.6–1.3)
EOSINOPHIL # BLD: 0.2 TH/MM3 (ref 0–0.4)
EOSINOPHIL NFR BLD: 1.6 % (ref 0–4)
ERYTHROCYTE [DISTWIDTH] IN BLOOD BY AUTOMATED COUNT: 16.3 % (ref 11.6–17.2)
GFR SERPLBLD BASED ON 1.73 SQ M-ARVRAT: 73 ML/MIN (ref 89–?)
GLUCOSE SERPL-MCNC: 133 MG/DL (ref 74–106)
HCO3 BLD-SCNC: 25.5 MEQ/L (ref 21–32)
HCT VFR BLD CALC: 36.2 % (ref 39–51)
HGB BLD-MCNC: 11.8 GM/DL (ref 13–17)
INR PPP: 1.1 RATIO
LYMPHOCYTES # BLD AUTO: 0.7 TH/MM3 (ref 1–4.8)
LYMPHOCYTES NFR BLD AUTO: 6.1 % (ref 9–44)
MAGNESIUM SERPL-MCNC: 2.1 MG/DL (ref 1.5–2.5)
MCH RBC QN AUTO: 29.1 PG (ref 27–34)
MCHC RBC AUTO-ENTMCNC: 32.6 % (ref 32–36)
MCV RBC AUTO: 89.2 FL (ref 80–100)
MONOCYTE #: 1.1 TH/MM3 (ref 0–0.9)
MONOCYTES NFR BLD: 8.9 % (ref 0–8)
NEUTROPHILS # BLD AUTO: 10.2 TH/MM3 (ref 1.8–7.7)
NEUTROPHILS NFR BLD AUTO: 82.9 % (ref 16–70)
PLATELET # BLD: 247 TH/MM3 (ref 150–450)
PMV BLD AUTO: 9.9 FL (ref 7–11)
PROTHROMBIN TIME: 11.4 SEC (ref 9.8–11.6)
RBC # BLD AUTO: 4.05 MIL/MM3 (ref 4.5–5.9)
SODIUM SERPL-SCNC: 133 MEQ/L (ref 136–145)
WBC # BLD AUTO: 12.3 TH/MM3 (ref 4–11)

## 2018-05-27 RX ADMIN — GUAIFENESIN SCH MG: 600 TABLET, EXTENDED RELEASE ORAL at 20:14

## 2018-05-27 RX ADMIN — Medication SCH ML: at 08:00

## 2018-05-27 RX ADMIN — FAMOTIDINE SCH MG: 20 TABLET, FILM COATED ORAL at 08:05

## 2018-05-27 RX ADMIN — STANDARDIZED SENNA CONCENTRATE AND DOCUSATE SODIUM SCH TAB: 8.6; 5 TABLET, FILM COATED ORAL at 20:14

## 2018-05-27 RX ADMIN — STANDARDIZED SENNA CONCENTRATE AND DOCUSATE SODIUM SCH TAB: 8.6; 5 TABLET, FILM COATED ORAL at 08:05

## 2018-05-27 RX ADMIN — FAMOTIDINE SCH MG: 20 TABLET, FILM COATED ORAL at 20:15

## 2018-05-27 RX ADMIN — CARVEDILOL SCH MG: 3.12 TABLET, FILM COATED ORAL at 20:14

## 2018-05-27 RX ADMIN — Medication SCH ML: at 20:14

## 2018-05-27 RX ADMIN — AZITHROMYCIN SCH MG: 250 TABLET, FILM COATED ORAL at 08:04

## 2018-05-27 RX ADMIN — FUROSEMIDE SCH MG: 20 TABLET ORAL at 08:04

## 2018-05-27 RX ADMIN — HEPARIN SODIUM PRN MLS/HR: 10000 INJECTION, SOLUTION INTRAVENOUS at 22:00

## 2018-05-27 RX ADMIN — ENALAPRIL MALEATE SCH MG: 2.5 TABLET ORAL at 07:55

## 2018-05-27 RX ADMIN — POTASSIUM CHLORIDE SCH MEQ: 20 TABLET, EXTENDED RELEASE ORAL at 08:05

## 2018-05-27 RX ADMIN — WARFARIN SODIUM SCH MG: 5 TABLET ORAL at 14:47

## 2018-05-27 RX ADMIN — CARVEDILOL SCH MG: 3.12 TABLET, FILM COATED ORAL at 07:55

## 2018-05-27 RX ADMIN — GUAIFENESIN SCH MG: 600 TABLET, EXTENDED RELEASE ORAL at 08:04

## 2018-05-27 NOTE — HHI.PR
Subjective


Remarks


Patient says he is feeling all right.  Denies any chest pain or shortness of 

breath.  Slight hemoptysis.





Patient says that he almost never uses crack.





Objective





Vital Signs








  Date Time  Temp Pulse Resp B/P (MAP) Pulse Ox O2 Delivery O2 Flow Rate FiO2


 


5/27/18 12:00      Room Air  


 


5/27/18 08:00      Room Air  


 


5/27/18 07:30 97.7 79 20 103/65 (78) 96   


 


5/27/18 04:00 98.8 89 16 112/72 (85) 96   


 


5/27/18 04:00      Room Air  


 


5/27/18 03:51  89      


 


5/27/18 00:00      Room Air  


 


5/27/18 00:00 99.1 82 17 104/75 (85) 94   


 


5/26/18 23:26  83      


 


5/26/18 23:23      Room Air  


 


5/26/18 21:00     96   


 


5/26/18 20:19 98.1 78 16 104/67 (79) 95   


 


5/26/18 17:07 98.8 86 20 105/69 (81) 93   


 


5/26/18 15:58  85      


 


5/26/18 14:13  91  114/76 (89)    














I/O      


 


 5/26/18 5/26/18 5/26/18 5/27/18 5/27/18 5/27/18





 06:59 14:59 22:59 06:59 14:59 22:59


 


Intake Total    660 ml  


 


Balance    660 ml  


 


      


 


Intake Oral    660 ml  


 


# Voids    3  


 


# Bowel Movements    0  








Result Diagram:  


5/27/18 0325                                                                   

             5/27/18 0325





Objective Remarks


GENERAL: Patient sitting up in bed.  Appears comfortable.


SKIN: Warm and dry.


HEAD: Normocephalic.


EYES: No scleral icterus. No injection or drainage. 


NECK: Supple, trachea midline. No JVD.


CARDIOVASCULAR: Regular rate and rhythm without murmurs, gallops, or rubs. 


RESPIRATORY: Breath sounds equal bilaterally. No accessory muscle use.


GASTROINTESTINAL: Abdomen soft, non-tender, nondistended. 


MUSCULOSKELETAL: No cyanosis, or edema. 


BACK: Nontender without obvious deformity. No CVA tenderness.








A/P


Assessment and Plan


//Acute systolic CHF ( with sob, and BNP of 4500/ cardiomegaly and pulmonary 

edema on CXR).  Improving continue diuresis and start Coreg.  Consider ACE 

inhibitor.  CHF education, I/O and monitor weight.  Monitor renal function and 

electrolytes.  Consult cardiology.


= Etiology following.  Appreciate assistance.





//Hyperkalemia.  5.5.  Likely secondary to hemolysis.  Kayexalate.  Recheck.





//Apical thrombus secondary to low ejection fraction.  Start anticoagulation 

with heparin drip.  Start Coumadin if okay with cardiology patient educated


elevated LFT's- due to alcohol/ CHF.  Negative hepatitis profile.  Liver 

sonogram noted.  Monitor


-INR 1.1.  On warfarin with heparin bridge.  Will need to be at least 1.8 prior 

to discharge.





//Bronchitis.  Continue Zithromax and nebulization.





//Heartburn.  On telemetry reflux mechanics are discussed with the patient 

start PPI and antacids





//Chronic low back pain.  Symptomatic treatment





//Tobacco, cocaine abuse.  Cessation counseling discussed.





//DVT prophylaxis with SCD and early ambulation


Discharge Planning


Discharge when INR stable above 2.0











Nelson Martínez MD May 27, 2018 13:21

## 2018-05-27 NOTE — PD.CARD.PN
Subjective


Subjective Remarks


Dyspnea "much better".   Orthopnea better.  No PND, CP, dizziness, palpitations.





Objective


Medications











Item Value  Date Time


 


Furosemide 20 mg 5/27/18 0900





 (Lasix) DAILY/PO 5/27/18 0804


 


Enalapril Maleate 2.5 mg 5/27/18 0900





 (Vasotec) DAILY/PO 


 


Warfarin Sodium 5 mg 5/26/18 1600





 (Coumadin) DAILY@1600/PO 5/26/18 1511


 


Carvedilol 3.125 mg 5/26/18 1400





 (Coreg) Q12HR/PO 


 


Heparin Sodium/ 250 ml @ 8 mls/hr 5/26/18 1345





Dextrose TITRATE  PRN/IV 5/26/18 1516











Current Medications








 Medications


  (Trade)  Dose


 Ordered  Sig/James


 Route  Start Time


 Stop Time Status Last Admin


 


  (KCl)  20 meq  DAILY


 PO  5/25/18 09:00


    5/26/18 08:56


 


 


  (Duoneb Neb)  1 ampule  QID NEB  PRN


 NEB  5/24/18 12:00


     


 


 


  (Proair Hfa Inh)  1 puff  Q4H  PRN


 INH  5/25/18 14:45


     


 


 


  (Zithromax)  250 mg  DAILY


 PO  5/25/18 14:45


 5/29/18 14:44  5/27/18 08:04


 


 


  (Tessalon)  200 mg  Q8H  PRN


 PO  5/25/18 14:45


     


 


 


  (Mucinex Er)  600 mg  BID


 PO  5/25/18 15:00


    5/27/18 08:04


 


 


  (Mag-Al Plus


 Susp Liq)  30 ml  Q6H  PRN


 PO  5/25/18 14:45


     


 


 


  (Tums Chew)  500 mg  Q6H  PRN


 CHEW  5/25/18 14:45


    5/25/18 16:46


 


 


  (Pepcid)  20 mg  BID


 PO  5/25/18 15:00


    5/27/18 08:05


 


 


  (NS Flush)  2 ml  UNSCH  PRN


 IV FLUSH  5/25/18 15:00


     


 


 


  (NS Flush)  2 ml  BID


 IV FLUSH  5/25/18 21:00


    5/26/18 20:51


 


 


  (Tylenol)  650 mg  Q4H  PRN


 PO  5/25/18 15:00


     


 


 


  (Zofran  Odt)  4 mg  Q6H  PRN


 PO  5/25/18 15:00


     


 


 


  (Tylenol)  650 mg  Q6H  PRN


 PO  5/25/18 15:00


     


 


 


  (Narcan Inj)  0.4 mg  UNSCH  PRN


 IV PUSH  5/25/18 15:00


     


 


 


  (Mary-Colace)  1 tab  BID


 PO  5/25/18 15:00


    5/27/18 08:05


 


 


  (Milk Of


 Magnesia Liq)  30 ml  Q12H  PRN


 PO  5/25/18 15:00


     


 


 


  (Senokot)  17.2 mg  Q12H  PRN


 PO  5/25/18 15:00


     


 


 


  (Dulcolax Supp)  10 mg  DAILY  PRN


 RECTAL  5/25/18 15:00


     


 


 


  (Lactulose Liq)  30 ml  DAILY  PRN


 PO  5/25/18 15:00


     


 


 


  (Roxicodone)  5 mg  Q6H  PRN


 PO  5/25/18 15:00


    5/27/18 11:20


 


 


  (Lasix)  20 mg  DAILY


 PO  5/27/18 09:00


    5/27/18 08:04


 


 


  (Coreg)  3.125 mg  Q12HR


 PO  5/26/18 14:00


    5/26/18 20:50


 


 


 Heparin Sodium/


 Dextrose  250 ml @ 8


 mls/hr  TITRATE  PRN


 IV  5/26/18 13:45


    5/26/18 15:16


 


 


 Pharmacy Profile


 Note  0 ml @ 0


 mls/hr  UNSCH


 OTHER  5/26/18 13:45


     


 


 


  (Coumadin)  5 mg  DAILY@1600


 PO  5/26/18 16:00


    5/26/18 15:11


 


 


  (Vasotec)  2.5 mg  DAILY


 PO  5/27/18 09:00


     


 








Vital Signs / I&O





Vital Signs








  Date Time  Temp Pulse Resp B/P (MAP) Pulse Ox O2 Delivery O2 Flow Rate FiO2


 


5/27/18 07:30 97.7 79 20 103/65 (78) 96   


 


5/27/18 04:00 98.8 89 16 112/72 (85) 96   


 


5/27/18 04:00      Room Air  


 


5/27/18 03:51  89      


 


5/27/18 00:00      Room Air  


 


5/27/18 00:00 99.1 82 17 104/75 (85) 94   


 


5/26/18 23:26  83      


 


5/26/18 23:23      Room Air  


 


5/26/18 21:00     96   


 


5/26/18 20:19 98.1 78 16 104/67 (79) 95   


 


5/26/18 17:07 98.8 86 20 105/69 (81) 93   


 


5/26/18 15:58  85      


 


5/26/18 14:13  91  114/76 (89)    


 


5/26/18 13:09 98.0 90 16 110/78 (89) 96   


 


5/26/18 12:58 98.7 91 18 105/71 (82) 94   














I/O      


 


 5/26/18 5/26/18 5/26/18 5/27/18 5/27/18 5/27/18





 07:00 15:00 23:00 07:00 15:00 23:00


 


Intake Total    660 ml  


 


Balance    660 ml  


 


      


 


Intake Oral    660 ml  


 


# Voids    3  


 


# Bowel Movements    0  








Physical Exam


GENERAL: Well developed, thin. No acute distress.


HEENT: Jugular venous pressure is normal. 


CHEST: Diminished breath sounds bases.


CARDIAC: Regular rate and rhythm without S3, S4, or murmur.


ABDOMEN: Soft, nontender, no hepatosplenomegaly. Bowel sounds present.


EXTREMITIES: No clubbing, cyanosis, or edema.


Laboratory





Laboratory Tests








Test


  5/26/18


14:10 5/26/18


21:50 5/27/18


03:25


 


White Blood Count 12.6 TH/MM3   12.3 TH/MM3 


 


Red Blood Count 4.75 MIL/MM3   4.05 MIL/MM3 


 


Hemoglobin 13.9 GM/DL   11.8 GM/DL 


 


Hematocrit 42.9 %   36.2 % 


 


Mean Corpuscular Volume 90.3 FL   89.2 FL 


 


Mean Corpuscular Hemoglobin 29.2 PG   29.1 PG 


 


Mean Corpuscular Hemoglobin


Concent 32.3 % 


  


  32.6 % 


 


 


Red Cell Distribution Width 16.0 %   16.3 % 


 


Platelet Count 234 TH/MM3   247 TH/MM3 


 


Mean Platelet Volume 8.8 FL   9.9 FL 


 


Prothrombin Time 11.8 SEC   11.4 SEC 


 


Prothromb Time International


Ratio 1.2 RATIO 


  


  1.1 RATIO 


 


 


Activated Partial


Thromboplast Time 23.6 SEC 


  27.2 SEC 


  25.6 SEC 


 


 


Neutrophils (%) (Auto)   82.9 % 


 


Lymphocytes (%) (Auto)   6.1 % 


 


Monocytes (%) (Auto)   8.9 % 


 


Eosinophils (%) (Auto)   1.6 % 


 


Basophils (%) (Auto)   0.5 % 


 


Neutrophils # (Auto)   10.2 TH/MM3 


 


Lymphocytes # (Auto)   0.7 TH/MM3 


 


Monocytes # (Auto)   1.1 TH/MM3 


 


Eosinophils # (Auto)   0.2 TH/MM3 


 


Basophils # (Auto)   0.1 TH/MM3 


 


CBC Comment   DIFF FINAL 


 


Differential Comment    


 


Blood Urea Nitrogen   23 MG/DL 


 


Creatinine   1.07 MG/DL 


 


Random Glucose   133 MG/DL 


 


Calcium Level   8.1 MG/DL 


 


Magnesium Level   2.1 MG/DL 


 


Sodium Level   133 MEQ/L 


 


Potassium Level   5.5 MEQ/L 


 


Chloride Level   99 MEQ/L 


 


Carbon Dioxide Level   25.5 MEQ/L 


 


Anion Gap   9 MEQ/L 


 


Estimat Glomerular Filtration


Rate 


  


  73 ML/MIN 


 











Assessment and Plan


Problem List:  


(1) Dilated cardiomyopathy


ICD Codes:  I42.0 - Dilated cardiomyopathy


Status:  Chronic


Plan:  Stable overnight.  No overt CHF by current exam.  EF likely 10-15% by 

echo.  Suspect his cardiomyopathy is nonischemic in origin.





REC continue beta blocker, ACE-I, diuretic


         would not discharge until his INR is at least 1.8


         complete cessation of any alcohol consumption


         will f/u PRN





(2) Left ventricular apical thrombus


Status:  Chronic


Plan:  Continue warfarin.  Would not discharge until INR at least 1.8.  





Code Status


full code


Discussed Condition With


patient











Tavo Gann MD May 27, 2018 12:09

## 2018-05-28 VITALS
RESPIRATION RATE: 20 BRPM | HEART RATE: 77 BPM | TEMPERATURE: 98.1 F | SYSTOLIC BLOOD PRESSURE: 96 MMHG | OXYGEN SATURATION: 95 % | DIASTOLIC BLOOD PRESSURE: 56 MMHG

## 2018-05-28 VITALS
RESPIRATION RATE: 20 BRPM | TEMPERATURE: 97.9 F | HEART RATE: 81 BPM | SYSTOLIC BLOOD PRESSURE: 115 MMHG | DIASTOLIC BLOOD PRESSURE: 72 MMHG | OXYGEN SATURATION: 98 %

## 2018-05-28 VITALS
SYSTOLIC BLOOD PRESSURE: 96 MMHG | DIASTOLIC BLOOD PRESSURE: 59 MMHG | HEART RATE: 84 BPM | OXYGEN SATURATION: 97 % | TEMPERATURE: 98.8 F | RESPIRATION RATE: 21 BRPM

## 2018-05-28 VITALS
RESPIRATION RATE: 18 BRPM | TEMPERATURE: 97.7 F | HEART RATE: 78 BPM | OXYGEN SATURATION: 95 % | SYSTOLIC BLOOD PRESSURE: 109 MMHG | DIASTOLIC BLOOD PRESSURE: 79 MMHG

## 2018-05-28 VITALS
OXYGEN SATURATION: 97 % | TEMPERATURE: 98 F | DIASTOLIC BLOOD PRESSURE: 80 MMHG | HEART RATE: 84 BPM | RESPIRATION RATE: 16 BRPM | SYSTOLIC BLOOD PRESSURE: 118 MMHG

## 2018-05-28 VITALS
HEART RATE: 78 BPM | DIASTOLIC BLOOD PRESSURE: 63 MMHG | RESPIRATION RATE: 20 BRPM | OXYGEN SATURATION: 96 % | SYSTOLIC BLOOD PRESSURE: 101 MMHG | TEMPERATURE: 97.7 F

## 2018-05-28 VITALS — HEART RATE: 94 BPM

## 2018-05-28 VITALS — HEART RATE: 90 BPM

## 2018-05-28 VITALS — HEART RATE: 78 BPM

## 2018-05-28 VITALS — HEART RATE: 84 BPM

## 2018-05-28 LAB
ALBUMIN SERPL-MCNC: 2.3 GM/DL (ref 3.4–5)
ALBUMIN SERPL-MCNC: 2.4 GM/DL (ref 3.4–5)
ALP SERPL-CCNC: 146 U/L (ref 45–117)
ALT SERPL-CCNC: 280 U/L (ref 12–78)
AST SERPL-CCNC: 33 U/L (ref 15–37)
BASOPHILS # BLD AUTO: 0 TH/MM3 (ref 0–0.2)
BASOPHILS NFR BLD: 0.5 % (ref 0–2)
BILIRUB INDIRECT SERPL-MCNC: 0.2 MG/DL (ref 0–0.8)
BILIRUB SERPL-MCNC: 0.3 MG/DL (ref 0.2–1)
BUN SERPL-MCNC: 16 MG/DL (ref 7–18)
CALCIUM SERPL-MCNC: 8.4 MG/DL (ref 8.5–10.1)
CHLORIDE SERPL-SCNC: 100 MEQ/L (ref 98–107)
CREAT SERPL-MCNC: 0.83 MG/DL (ref 0.6–1.3)
DIRECT BILIRUBIN ADULT: 0.1 MG/DL (ref 0–0.2)
EOSINOPHIL # BLD: 0.2 TH/MM3 (ref 0–0.4)
EOSINOPHIL NFR BLD: 1.8 % (ref 0–4)
ERYTHROCYTE [DISTWIDTH] IN BLOOD BY AUTOMATED COUNT: 16.3 % (ref 11.6–17.2)
GFR SERPLBLD BASED ON 1.73 SQ M-ARVRAT: 98 ML/MIN (ref 89–?)
GLUCOSE SERPL-MCNC: 107 MG/DL (ref 74–106)
HCO3 BLD-SCNC: 27.9 MEQ/L (ref 21–32)
HCT VFR BLD CALC: 41.2 % (ref 39–51)
HGB BLD-MCNC: 13.3 GM/DL (ref 13–17)
INR PPP: 1.2 RATIO
LYMPHOCYTES # BLD AUTO: 1 TH/MM3 (ref 1–4.8)
LYMPHOCYTES NFR BLD AUTO: 9.8 % (ref 9–44)
MAGNESIUM SERPL-MCNC: 2.4 MG/DL (ref 1.5–2.5)
MCH RBC QN AUTO: 28.8 PG (ref 27–34)
MCHC RBC AUTO-ENTMCNC: 32.2 % (ref 32–36)
MCV RBC AUTO: 89.4 FL (ref 80–100)
MONOCYTE #: 0.9 TH/MM3 (ref 0–0.9)
MONOCYTES NFR BLD: 9.4 % (ref 0–8)
NEUTROPHILS # BLD AUTO: 7.8 TH/MM3 (ref 1.8–7.7)
NEUTROPHILS NFR BLD AUTO: 78.5 % (ref 16–70)
PHOSPHATE SERPL-MCNC: 3.2 MG/DL (ref 2.5–4.9)
PLATELET # BLD: 286 TH/MM3 (ref 150–450)
PMV BLD AUTO: 9.3 FL (ref 7–11)
PROT SERPL-MCNC: 6.3 GM/DL (ref 6.4–8.2)
PROTHROMBIN TIME: 12.5 SEC (ref 9.8–11.6)
RBC # BLD AUTO: 4.61 MIL/MM3 (ref 4.5–5.9)
SODIUM SERPL-SCNC: 137 MEQ/L (ref 136–145)
WBC # BLD AUTO: 10 TH/MM3 (ref 4–11)

## 2018-05-28 RX ADMIN — Medication SCH ML: at 23:13

## 2018-05-28 RX ADMIN — GUAIFENESIN SCH MG: 600 TABLET, EXTENDED RELEASE ORAL at 21:00

## 2018-05-28 RX ADMIN — STANDARDIZED SENNA CONCENTRATE AND DOCUSATE SODIUM SCH TAB: 8.6; 5 TABLET, FILM COATED ORAL at 09:31

## 2018-05-28 RX ADMIN — POTASSIUM CHLORIDE SCH MEQ: 20 TABLET, EXTENDED RELEASE ORAL at 09:32

## 2018-05-28 RX ADMIN — GUAIFENESIN SCH MG: 600 TABLET, EXTENDED RELEASE ORAL at 09:31

## 2018-05-28 RX ADMIN — AZITHROMYCIN SCH MG: 250 TABLET, FILM COATED ORAL at 09:32

## 2018-05-28 RX ADMIN — PROCHLORPERAZINE EDISYLATE PRN MG: 5 INJECTION INTRAMUSCULAR; INTRAVENOUS at 23:13

## 2018-05-28 RX ADMIN — GUAIFENESIN SCH MG: 600 TABLET, EXTENDED RELEASE ORAL at 23:13

## 2018-05-28 RX ADMIN — Medication SCH ML: at 09:00

## 2018-05-28 RX ADMIN — STANDARDIZED SENNA CONCENTRATE AND DOCUSATE SODIUM SCH TAB: 8.6; 5 TABLET, FILM COATED ORAL at 21:00

## 2018-05-28 RX ADMIN — STANDARDIZED SENNA CONCENTRATE AND DOCUSATE SODIUM SCH TAB: 8.6; 5 TABLET, FILM COATED ORAL at 23:13

## 2018-05-28 RX ADMIN — FAMOTIDINE SCH MG: 20 TABLET, FILM COATED ORAL at 21:00

## 2018-05-28 RX ADMIN — CARVEDILOL SCH MG: 3.12 TABLET, FILM COATED ORAL at 09:31

## 2018-05-28 RX ADMIN — FAMOTIDINE SCH MG: 20 TABLET, FILM COATED ORAL at 23:14

## 2018-05-28 RX ADMIN — FUROSEMIDE SCH MG: 20 TABLET ORAL at 09:31

## 2018-05-28 RX ADMIN — CARVEDILOL SCH MG: 3.12 TABLET, FILM COATED ORAL at 21:00

## 2018-05-28 RX ADMIN — FAMOTIDINE SCH MG: 20 TABLET, FILM COATED ORAL at 09:31

## 2018-05-28 RX ADMIN — WARFARIN SODIUM SCH MG: 5 TABLET ORAL at 16:10

## 2018-05-28 RX ADMIN — ENALAPRIL MALEATE SCH MG: 2.5 TABLET ORAL at 09:31

## 2018-05-28 RX ADMIN — HEPARIN SODIUM PRN MLS/HR: 10000 INJECTION, SOLUTION INTRAVENOUS at 09:49

## 2018-05-28 RX ADMIN — HEPARIN SODIUM PRN MLS/HR: 10000 INJECTION, SOLUTION INTRAVENOUS at 23:29

## 2018-05-28 NOTE — HHI.PR
Subjective


Remarks


Patient says he is feeling well.  No hemoptysis today.  Denies any chest pain 

or shortness of breath.  He says he is not getting enough food at meals.  Will 

add Ensure.





Objective





Vital Signs








  Date Time  Temp Pulse Resp B/P (MAP) Pulse Ox O2 Delivery O2 Flow Rate FiO2


 


5/28/18 07:45 97.9 81 20 115/72 (86) 98   


 


5/28/18 04:02  90      


 


5/28/18 04:00 97.7 78 18 109/79 (89) 95   


 


5/28/18 04:00      Room Air  


 


5/28/18 00:07  94      


 


5/28/18 00:00      Room Air  


 


5/28/18 00:00 98.0 84 16 118/80 (93) 97   


 


5/27/18 20:00      Room Air  


 


5/27/18 20:00 98.4 88 17 104/72 (83) 98   


 


5/27/18 19:58  85      


 


5/27/18 16:00      Room Air  


 


5/27/18 15:30 98.3 89 18 99/59 (72) 95   


 


5/27/18 12:00      Room Air  


 


5/27/18 11:51  72      


 


5/27/18 11:45 97.5 80 20 106/63 (77) 93   














I/O      


 


 5/27/18 5/27/18 5/27/18 5/28/18 5/28/18 5/28/18





 07:00 15:00 23:00 07:00 15:00 23:00


 


Intake Total 660 ml  1082 ml 480 ml  


 


Output Total   1000 ml 750 ml  


 


Balance 660 ml  82 ml -270 ml  


 


      


 


Intake Oral 660 ml  840 ml 480 ml  


 


IV Total   242 ml   


 


Output Urine Total   1000 ml 750 ml  


 


# Voids 3     


 


# Bowel Movements 0     








Result Diagram:  


5/28/18 0330                                                                   

             5/28/18 0330





Objective Remarks


GENERAL: Patient sitting up in bed.  Appears comfortable.  No change on exam.


SKIN: Warm and dry.


HEAD: Normocephalic.


EYES: No scleral icterus. No injection or drainage. 


NECK: Supple, trachea midline. No JVD.


CARDIOVASCULAR: Regular rate and rhythm without murmurs, gallops, or rubs. 


RESPIRATORY: Breath sounds equal bilaterally. No accessory muscle use.


GASTROINTESTINAL: Abdomen soft, non-tender, nondistended. 


MUSCULOSKELETAL: No cyanosis, or edema. 


BACK: Nontender without obvious deformity. No CVA tenderness.








A/P


Assessment and Plan


//Acute systolic CHF ( with sob, and BNP of 4500/ cardiomegaly and pulmonary 

edema on CXR).  Improving continue diuresis and start Coreg.  Consider ACE 

inhibitor.  CHF education, I/O and monitor weight.  Monitor renal function and 

electrolytes.  Consult cardiology.


= Etiology following.  Appreciate assistance.





//Hyperkalemia.  5.5.  Likely secondary to hemolysis.  Kayexalate. 


= Repeat potassium today is 4.1.  Was likely secondary to hemolysis.  Continue 

to monitor intermittently.





//Apical thrombus secondary to low ejection fraction.  Start anticoagulation 

with heparin drip.  Start Coumadin if okay with cardiology patient educated


elevated LFT's- due to alcohol/ CHF.  Negative hepatitis profile.  Liver 

sonogram noted.  Monitor


-INR 1.2.  On warfarin with heparin bridge.  Will need to be at least 1.8 prior 

to discharge.





//Bronchitis.  Continue Zithromax and nebulization.





//Heartburn.  On telemetry reflux mechanics are discussed with the patient 

start PPI and antacids





//Chronic low back pain.  Symptomatic treatment





//Tobacco, cocaine abuse.  Cessation counseling discussed.





//DVT prophylaxis with SCD and early ambulation


Discharge Planning


Discharge when INR stable above 2.0











Nelson Martínez MD May 28, 2018 10:21

## 2018-05-29 VITALS
OXYGEN SATURATION: 100 % | SYSTOLIC BLOOD PRESSURE: 117 MMHG | HEART RATE: 75 BPM | TEMPERATURE: 97.4 F | RESPIRATION RATE: 16 BRPM | DIASTOLIC BLOOD PRESSURE: 79 MMHG

## 2018-05-29 VITALS
HEART RATE: 87 BPM | DIASTOLIC BLOOD PRESSURE: 74 MMHG | TEMPERATURE: 97.3 F | RESPIRATION RATE: 17 BRPM | SYSTOLIC BLOOD PRESSURE: 105 MMHG | OXYGEN SATURATION: 98 %

## 2018-05-29 VITALS
RESPIRATION RATE: 20 BRPM | OXYGEN SATURATION: 99 % | DIASTOLIC BLOOD PRESSURE: 63 MMHG | SYSTOLIC BLOOD PRESSURE: 118 MMHG | HEART RATE: 83 BPM | TEMPERATURE: 98.1 F

## 2018-05-29 VITALS
SYSTOLIC BLOOD PRESSURE: 111 MMHG | TEMPERATURE: 98.1 F | OXYGEN SATURATION: 97 % | HEART RATE: 73 BPM | DIASTOLIC BLOOD PRESSURE: 66 MMHG | RESPIRATION RATE: 19 BRPM

## 2018-05-29 VITALS
DIASTOLIC BLOOD PRESSURE: 76 MMHG | RESPIRATION RATE: 17 BRPM | HEART RATE: 93 BPM | OXYGEN SATURATION: 96 % | SYSTOLIC BLOOD PRESSURE: 107 MMHG | TEMPERATURE: 98.1 F

## 2018-05-29 VITALS
SYSTOLIC BLOOD PRESSURE: 121 MMHG | HEART RATE: 81 BPM | TEMPERATURE: 97.9 F | OXYGEN SATURATION: 100 % | RESPIRATION RATE: 17 BRPM | DIASTOLIC BLOOD PRESSURE: 92 MMHG

## 2018-05-29 VITALS
SYSTOLIC BLOOD PRESSURE: 93 MMHG | DIASTOLIC BLOOD PRESSURE: 66 MMHG | OXYGEN SATURATION: 99 % | TEMPERATURE: 97.7 F | HEART RATE: 87 BPM | RESPIRATION RATE: 16 BRPM

## 2018-05-29 VITALS — HEART RATE: 84 BPM

## 2018-05-29 LAB
ERYTHROCYTE [DISTWIDTH] IN BLOOD BY AUTOMATED COUNT: 16.2 % (ref 11.6–17.2)
HCT VFR BLD CALC: 40.3 % (ref 39–51)
HGB BLD-MCNC: 13.3 GM/DL (ref 13–17)
INR PPP: 1.4 RATIO
MCH RBC QN AUTO: 29.1 PG (ref 27–34)
MCHC RBC AUTO-ENTMCNC: 33 % (ref 32–36)
MCV RBC AUTO: 88.4 FL (ref 80–100)
PLATELET # BLD: 344 TH/MM3 (ref 150–450)
PMV BLD AUTO: 8.7 FL (ref 7–11)
PROTHROMBIN TIME: 14.6 SEC (ref 9.8–11.6)
RBC # BLD AUTO: 4.56 MIL/MM3 (ref 4.5–5.9)
WBC # BLD AUTO: 10.5 TH/MM3 (ref 4–11)

## 2018-05-29 RX ADMIN — PROCHLORPERAZINE EDISYLATE PRN MG: 5 INJECTION INTRAMUSCULAR; INTRAVENOUS at 18:07

## 2018-05-29 RX ADMIN — Medication SCH ML: at 21:00

## 2018-05-29 RX ADMIN — AZITHROMYCIN SCH MG: 250 TABLET, FILM COATED ORAL at 09:45

## 2018-05-29 RX ADMIN — Medication SCH ML: at 09:00

## 2018-05-29 RX ADMIN — WARFARIN SODIUM SCH MG: 5 TABLET ORAL at 15:38

## 2018-05-29 RX ADMIN — FUROSEMIDE SCH MG: 20 TABLET ORAL at 09:45

## 2018-05-29 RX ADMIN — ENALAPRIL MALEATE SCH MG: 2.5 TABLET ORAL at 09:45

## 2018-05-29 RX ADMIN — GUAIFENESIN SCH MG: 600 TABLET, EXTENDED RELEASE ORAL at 09:45

## 2018-05-29 RX ADMIN — HEPARIN SODIUM PRN MLS/HR: 10000 INJECTION, SOLUTION INTRAVENOUS at 07:33

## 2018-05-29 RX ADMIN — FAMOTIDINE SCH MG: 20 TABLET, FILM COATED ORAL at 21:18

## 2018-05-29 RX ADMIN — HEPARIN SODIUM PRN MLS/HR: 10000 INJECTION, SOLUTION INTRAVENOUS at 15:35

## 2018-05-29 RX ADMIN — STANDARDIZED SENNA CONCENTRATE AND DOCUSATE SODIUM SCH TAB: 8.6; 5 TABLET, FILM COATED ORAL at 09:45

## 2018-05-29 RX ADMIN — CARVEDILOL SCH MG: 3.12 TABLET, FILM COATED ORAL at 21:19

## 2018-05-29 RX ADMIN — GUAIFENESIN SCH MG: 600 TABLET, EXTENDED RELEASE ORAL at 21:18

## 2018-05-29 RX ADMIN — STANDARDIZED SENNA CONCENTRATE AND DOCUSATE SODIUM SCH TAB: 8.6; 5 TABLET, FILM COATED ORAL at 21:00

## 2018-05-29 RX ADMIN — FAMOTIDINE SCH MG: 20 TABLET, FILM COATED ORAL at 09:45

## 2018-05-29 RX ADMIN — POTASSIUM CHLORIDE SCH MEQ: 20 TABLET, EXTENDED RELEASE ORAL at 09:45

## 2018-05-29 RX ADMIN — CARVEDILOL SCH MG: 3.12 TABLET, FILM COATED ORAL at 09:45

## 2018-05-29 NOTE — HHI.PR
Subjective


Remarks


Patient says he is feeling all right.  No hemoptysis.  Says he is looking 

forward to going home.  No bowel in 2 days but is not feel like he is 

constipated.





Objective





Vital Signs








  Date Time  Temp Pulse Resp B/P (MAP) Pulse Ox O2 Delivery O2 Flow Rate FiO2


 


5/29/18 12:00 97.3 87 17 105/74 (84) 98   


 


5/29/18 08:00      Room Air  


 


5/29/18 08:00 97.9 85 17 121/92 (102) 100   


 


5/29/18 08:00  81      


 


5/29/18 04:00 98.1 73 19 111/66 (81) 97   


 


5/29/18 01:51 98.1 83 20 118/63 (81) 99   


 


5/29/18 01:38      Room Air  


 


5/28/18 20:00 98.8 84 21 96/59 (71) 97   


 


5/28/18 16:00  84      


 


5/28/18 16:00      Room Air  


 


5/28/18 15:45 97.7 78 20 101/63 (76) 96   














I/O      


 


 5/28/18 5/28/18 5/28/18 5/29/18 5/29/18 5/29/18





 06:59 14:59 22:59 06:59 14:59 22:59


 


Intake Total 480 ml  960 ml 980 ml  


 


Output Total 750 ml  1250 ml 800 ml  


 


Balance -270 ml  -290 ml 180 ml  


 


      


 


Intake Oral 480 ml  960 ml 980 ml  


 


Output Urine Total 750 ml  1250 ml 800 ml  


 


# Bowel Movements    1  








Result Diagram:  


5/29/18 0520                                                                   

             5/28/18 0330





Objective Remarks


GENERAL: Patient sitting up in bed.  Appears comfortable.  Again, no change on 

exam.


SKIN: Warm and dry.


HEAD: Normocephalic.


EYES: No scleral icterus. No injection or drainage. 


NECK: Supple, trachea midline. No JVD.


CARDIOVASCULAR: Regular rate and rhythm without murmurs, gallops, or rubs. 


RESPIRATORY: Breath sounds equal bilaterally. No accessory muscle use.


GASTROINTESTINAL: Abdomen soft, non-tender, nondistended. 


MUSCULOSKELETAL: No cyanosis, or edema. 


BACK: Nontender without obvious deformity. No CVA tenderness.








A/P


Assessment and Plan


//Acute systolic CHF ( with sob, and BNP of 4500/ cardiomegaly and pulmonary 

edema on CXR).  Improving continue diuresis and start Coreg.  Consider ACE 

inhibitor.  CHF education, I/O and monitor weight.  Monitor renal function and 

electrolytes.  Consult cardiology.


= Cardiology following.  Appreciate assistance.





//Hyperkalemia.  5.5.  Likely secondary to hemolysis.  Kayexalate. 


= Repeat potassium today is 4.1.  Was likely secondary to hemolysis.  Continue 

to monitor intermittently.





//Apical thrombus secondary to low ejection fraction.  Start anticoagulation 

with heparin drip.  Start Coumadin if okay with cardiology patient educated


elevated LFT's- due to alcohol/ CHF.  Negative hepatitis profile.  Liver 

sonogram noted.  Monitor


-INR 1.4.  On warfarin with heparin bridge.  Will need to be at least 1.8 prior 

to discharge.





//Bronchitis.  Continue Zithromax and nebulization.





//Heartburn.  On telemetry reflux mechanics are discussed with the patient 

start PPI and antacids





//Chronic low back pain.  Symptomatic treatment





//Tobacco, cocaine abuse.  Cessation counseling discussed.





//DVT prophylaxis with SCD and early ambulation


Discharge Planning


Discharge when INR stable above 2.0











Nelson Martínez MD May 29, 2018 14:58

## 2018-05-30 VITALS
RESPIRATION RATE: 16 BRPM | SYSTOLIC BLOOD PRESSURE: 113 MMHG | TEMPERATURE: 98 F | OXYGEN SATURATION: 99 % | HEART RATE: 84 BPM | DIASTOLIC BLOOD PRESSURE: 75 MMHG

## 2018-05-30 VITALS
RESPIRATION RATE: 18 BRPM | HEART RATE: 94 BPM | OXYGEN SATURATION: 96 % | SYSTOLIC BLOOD PRESSURE: 101 MMHG | TEMPERATURE: 98.3 F | DIASTOLIC BLOOD PRESSURE: 81 MMHG

## 2018-05-30 VITALS
SYSTOLIC BLOOD PRESSURE: 101 MMHG | HEART RATE: 86 BPM | OXYGEN SATURATION: 98 % | DIASTOLIC BLOOD PRESSURE: 55 MMHG | TEMPERATURE: 98.5 F | RESPIRATION RATE: 20 BRPM

## 2018-05-30 VITALS
HEART RATE: 82 BPM | TEMPERATURE: 98.2 F | DIASTOLIC BLOOD PRESSURE: 70 MMHG | OXYGEN SATURATION: 96 % | SYSTOLIC BLOOD PRESSURE: 120 MMHG | RESPIRATION RATE: 18 BRPM

## 2018-05-30 VITALS
DIASTOLIC BLOOD PRESSURE: 76 MMHG | RESPIRATION RATE: 17 BRPM | OXYGEN SATURATION: 100 % | SYSTOLIC BLOOD PRESSURE: 114 MMHG | TEMPERATURE: 97.8 F | HEART RATE: 81 BPM

## 2018-05-30 VITALS — HEART RATE: 83 BPM

## 2018-05-30 VITALS — HEART RATE: 76 BPM

## 2018-05-30 VITALS — HEART RATE: 75 BPM

## 2018-05-30 VITALS — HEART RATE: 88 BPM

## 2018-05-30 VITALS — OXYGEN SATURATION: 100 %

## 2018-05-30 VITALS — OXYGEN SATURATION: 98 %

## 2018-05-30 VITALS — HEART RATE: 84 BPM

## 2018-05-30 VITALS — HEART RATE: 90 BPM

## 2018-05-30 LAB
ALBUMIN SERPL-MCNC: 2.3 GM/DL (ref 3.4–5)
ALP SERPL-CCNC: 126 U/L (ref 45–117)
ALT SERPL-CCNC: 156 U/L (ref 12–78)
AST SERPL-CCNC: 24 U/L (ref 15–37)
BILIRUB SERPL-MCNC: 0.2 MG/DL (ref 0.2–1)
BUN SERPL-MCNC: 19 MG/DL (ref 7–18)
CALCIUM SERPL-MCNC: 8.4 MG/DL (ref 8.5–10.1)
CHLORIDE SERPL-SCNC: 103 MEQ/L (ref 98–107)
CREAT SERPL-MCNC: 0.91 MG/DL (ref 0.6–1.3)
GFR SERPLBLD BASED ON 1.73 SQ M-ARVRAT: 88 ML/MIN (ref 89–?)
GLUCOSE SERPL-MCNC: 104 MG/DL (ref 74–106)
HCO3 BLD-SCNC: 27.1 MEQ/L (ref 21–32)
INR PPP: 1.8 RATIO
INR PPP: 1.9 RATIO
PROT SERPL-MCNC: 6 GM/DL (ref 6.4–8.2)
PROTHROMBIN TIME: 18.1 SEC (ref 9.8–11.6)
PROTHROMBIN TIME: 19.4 SEC (ref 9.8–11.6)
SODIUM SERPL-SCNC: 138 MEQ/L (ref 136–145)

## 2018-05-30 RX ADMIN — GUAIFENESIN SCH MG: 600 TABLET, EXTENDED RELEASE ORAL at 21:16

## 2018-05-30 RX ADMIN — ENALAPRIL MALEATE SCH MG: 2.5 TABLET ORAL at 08:57

## 2018-05-30 RX ADMIN — STANDARDIZED SENNA CONCENTRATE AND DOCUSATE SODIUM SCH TAB: 8.6; 5 TABLET, FILM COATED ORAL at 08:57

## 2018-05-30 RX ADMIN — CARVEDILOL SCH MG: 3.12 TABLET, FILM COATED ORAL at 21:16

## 2018-05-30 RX ADMIN — FUROSEMIDE SCH MG: 20 TABLET ORAL at 08:56

## 2018-05-30 RX ADMIN — WARFARIN SODIUM SCH MG: 5 TABLET ORAL at 15:55

## 2018-05-30 RX ADMIN — Medication SCH ML: at 08:56

## 2018-05-30 RX ADMIN — FAMOTIDINE SCH MG: 20 TABLET, FILM COATED ORAL at 08:57

## 2018-05-30 RX ADMIN — FAMOTIDINE SCH MG: 20 TABLET, FILM COATED ORAL at 21:16

## 2018-05-30 RX ADMIN — POTASSIUM CHLORIDE SCH MEQ: 20 TABLET, EXTENDED RELEASE ORAL at 08:57

## 2018-05-30 RX ADMIN — CARVEDILOL SCH MG: 3.12 TABLET, FILM COATED ORAL at 08:57

## 2018-05-30 RX ADMIN — Medication SCH ML: at 21:00

## 2018-05-30 RX ADMIN — GUAIFENESIN SCH MG: 600 TABLET, EXTENDED RELEASE ORAL at 08:56

## 2018-05-30 RX ADMIN — STANDARDIZED SENNA CONCENTRATE AND DOCUSATE SODIUM SCH TAB: 8.6; 5 TABLET, FILM COATED ORAL at 21:16

## 2018-05-30 NOTE — HHI.PR
Subjective


Remarks


Patient says he is feeling well.  Denies any chest pain or shortness of breath.

  Denies nausea or vomiting.





Objective





Vital Signs








  Date Time  Temp Pulse Resp B/P (MAP) Pulse Ox O2 Delivery O2 Flow Rate FiO2


 


5/30/18 08:01 97.8 81 17 114/76 (89) 100   


 


5/30/18 08:00      Room Air  21


 


5/30/18 05:05 98.0 84 16 113/75 (88) 99   


 


5/30/18 04:03  84      


 


5/30/18 04:00      Room Air  


 


5/30/18 00:22  83      


 


5/30/18 00:00      Room Air  


 


5/29/18 23:55 97.4 75 16 117/79 (92) 100   


 


5/29/18 20:00      Room Air  


 


5/29/18 19:46  84      


 


5/29/18 19:25 97.7 87 16 93/66 (75) 99   


 


5/29/18 16:00 98.1 93 17 107/76 (86) 96   


 


5/29/18 16:00      Room Air  


 


5/29/18 16:00  91      


 


5/29/18 12:00      Room Air  


 


5/29/18 12:00 97.3 87 17 105/74 (84) 98   


 


5/29/18 12:00  86      














I/O      


 


 5/29/18 5/29/18 5/29/18 5/30/18 5/30/18 5/30/18





 07:00 15:00 23:00 07:00 15:00 23:00


 


Intake Total 980 ml  1200 ml 600 ml  


 


Output Total 800 ml  900 ml 875 ml  


 


Balance 180 ml  300 ml -275 ml  


 


      


 


Intake Oral 980 ml  1200 ml 600 ml  


 


Output Urine Total 800 ml  900 ml 875 ml  


 


# Bowel Movements 1   0  








Result Diagram:  


5/29/18 0520                                                                   

             5/30/18 0352





Objective Remarks


GENERAL: Patient sitting up in bed.  Appears comfortable.  Again, no change on 

exam.  Alert and oriented 3.


SKIN: Warm and dry.


HEAD: Normocephalic.


EYES: No scleral icterus. No injection or drainage. 


NECK: Supple, trachea midline. No JVD.


CARDIOVASCULAR: Regular rate and rhythm without murmurs, gallops, or rubs. 


RESPIRATORY: Breath sounds equal bilaterally. No accessory muscle use.


GASTROINTESTINAL: Abdomen soft, non-tender, nondistended. 


MUSCULOSKELETAL: No cyanosis, or edema. 


BACK: Nontender without obvious deformity. No CVA tenderness.








A/P


Assessment and Plan


//Acute systolic CHF ( with sob, and BNP of 4500/ cardiomegaly and pulmonary 

edema on CXR).  Improving continue diuresis and start Coreg.  Consider ACE 

inhibitor.  CHF education, I/O and monitor weight.  Monitor renal function and 

electrolytes.  Consult cardiology.


= Cardiology following.  Appreciate assistance.





//Hyperkalemia.  5.5.  Likely secondary to hemolysis.  Kayexalate. 


= Repeat potassium today is 4.1.  Was likely secondary to hemolysis.  Continue 

to monitor intermittently.





//Apical thrombus secondary to low ejection fraction.  Start anticoagulation 

with heparin drip.  Start Coumadin if okay with cardiology patient educated


elevated LFT's- due to alcohol/ CHF.  Negative hepatitis profile.  Liver 

sonogram noted.  Monitor


-INR 1.9.  On warfarin with heparin bridge.  Following guidelines would be 

above 2.0 before discharge.  Given rate of rise if INR is above 2.0 this 

afternoon, will discharge home on warfarin.





//Bronchitis.  Continue Zithromax and nebulization.





//Heartburn.  On telemetry reflux mechanics are discussed with the patient 

start PPI and antacids





//Chronic low back pain.  Symptomatic treatment





//Tobacco, cocaine abuse.  Cessation counseling discussed.





//DVT prophylaxis with SCD and early ambulation


Discharge Planning


Discharge when INR stable above 2.0











Nelson Martínez MD May 30, 2018 10:06

## 2018-05-31 VITALS
HEART RATE: 85 BPM | DIASTOLIC BLOOD PRESSURE: 69 MMHG | TEMPERATURE: 97.7 F | SYSTOLIC BLOOD PRESSURE: 122 MMHG | OXYGEN SATURATION: 98 % | RESPIRATION RATE: 16 BRPM

## 2018-05-31 VITALS
TEMPERATURE: 98.5 F | SYSTOLIC BLOOD PRESSURE: 98 MMHG | RESPIRATION RATE: 20 BRPM | HEART RATE: 77 BPM | DIASTOLIC BLOOD PRESSURE: 57 MMHG | OXYGEN SATURATION: 97 %

## 2018-05-31 VITALS — HEART RATE: 75 BPM

## 2018-05-31 VITALS
TEMPERATURE: 97.8 F | OXYGEN SATURATION: 97 % | SYSTOLIC BLOOD PRESSURE: 125 MMHG | HEART RATE: 85 BPM | RESPIRATION RATE: 16 BRPM | DIASTOLIC BLOOD PRESSURE: 85 MMHG

## 2018-05-31 LAB
INR PPP: 2.1 RATIO
PROTHROMBIN TIME: 21.7 SEC (ref 9.8–11.6)

## 2018-05-31 RX ADMIN — CARVEDILOL SCH MG: 3.12 TABLET, FILM COATED ORAL at 09:03

## 2018-05-31 RX ADMIN — ENALAPRIL MALEATE SCH MG: 2.5 TABLET ORAL at 09:02

## 2018-05-31 RX ADMIN — POTASSIUM CHLORIDE SCH MEQ: 20 TABLET, EXTENDED RELEASE ORAL at 09:02

## 2018-05-31 RX ADMIN — STANDARDIZED SENNA CONCENTRATE AND DOCUSATE SODIUM SCH TAB: 8.6; 5 TABLET, FILM COATED ORAL at 09:02

## 2018-05-31 RX ADMIN — GUAIFENESIN SCH MG: 600 TABLET, EXTENDED RELEASE ORAL at 09:03

## 2018-05-31 RX ADMIN — FUROSEMIDE SCH MG: 20 TABLET ORAL at 09:03

## 2018-05-31 RX ADMIN — HEPARIN SODIUM PRN MLS/HR: 10000 INJECTION, SOLUTION INTRAVENOUS at 01:05

## 2018-05-31 RX ADMIN — FAMOTIDINE SCH MG: 20 TABLET, FILM COATED ORAL at 09:02

## 2018-05-31 RX ADMIN — Medication SCH ML: at 09:10

## 2018-05-31 RX ADMIN — PROCHLORPERAZINE EDISYLATE PRN MG: 5 INJECTION INTRAMUSCULAR; INTRAVENOUS at 09:03

## 2018-05-31 NOTE — HHI.DS
__________________________________________________





Discharge Summary


Admission Date


May 26, 2018 at 14:24


Discharge Date:  May 31, 2018


Admitting Diagnosis





congestive heart failure





(1) CHF (congestive heart failure)


ICD Code:  I50.9 - Heart failure, unspecified


Diagnosis:  Principal





Procedures


none


Brief History - From Admission


patient is a 51 y/o male , homeless,with history of anxiety disorder, 

questionable angina,pneumothorax and liver lacerations presented to ER with 

shortness of breath.he says that he's had sob for the past one week. he has 

occasional productive cough of yellowish/ pinkish sputum. he doesn't report any 

fever . he says that he's had some pain to the lower chest which is worse with 

the cough. he has minimal to mild lower abdominal pain with no nausea or 

vomiting.he was seen in ER yesterday and was discharged on zithromax and 

albuterol with the impression of bronchitis. he says that his sob and cough got 

worse and he decided to come back to ER.


CBC/BMP:  


5/29/18 0520                                                                   

             5/30/18 0352





Significant Findings





Laboratory Tests








Test


  5/28/18


20:21 5/29/18


05:20 5/29/18


13:59 5/29/18


21:11


 


Prothrombin Time


  


  14.6 SEC


(9.8-11.6) 


  


 


 


Activated Partial


Thromboplast Time 


  33.0 SEC


(24.3-30.1) 36.2 SEC


(24.3-30.1) 35.4 SEC


(24.3-30.1)


 


Test


  5/30/18


03:52 5/30/18


11:08 5/30/18


16:06 5/31/18


06:28


 


Prothrombin Time


  19.4 SEC


(9.8-11.6) 


  18.1 SEC


(9.8-11.6) 21.7 SEC


(9.8-11.6)


 


Activated Partial


Thromboplast Time 63.6 SEC


(24.3-30.1) 66.1 SEC


(24.3-30.1) 


  76.7 SEC


(24.3-30.1)


 


Blood Urea Nitrogen


  19 MG/DL


(7-18) 


  


  


 


 


Total Protein


  6.0 GM/DL


(6.4-8.2) 


  


  


 


 


Albumin


  2.3 GM/DL


(3.4-5.0) 


  


  


 


 


Calcium Level


  8.4 MG/DL


(8.5-10.1) 


  


  


 


 


Alkaline Phosphatase


  126 U/L


() 


  


  


 


 


Alanine Aminotransferase


(ALT/SGPT) 156 U/L


(12-78) 


  


  


 


 


Estimat Glomerular Filtration


Rate 88 ML/MIN


(>89) 


  


  


 








Imaging





Last Impressions








Liver Ultrasound 5/24/18 0000 Signed





Impressions: 





 CONCLUSION: 





 1.  Trace ascites and small bilateral pleural effusions.





 2.  Otherwise, unremarkable right upper quadrant ultrasound exam. Specifically,





  no sonographic evidence for cholelithiasis or acute cholecystitis.





  





 








PE at Discharge


GENERAL: This is a well-nourished, well-developed patient, in no apparent 

distress.


SKIN: No rashes, ecchymoses or lesions. Cool and dry.


CARDIOVASCULAR: Regular rate and rhythm without murmurs, gallops, or rubs. 


RESPIRATORY: Clear to auscultation. Breath sounds equal bilaterally. No wheezes

, rales, or rhonchi.  


GASTROINTESTINAL: Abdomen soft, non-tender, nondistended.  No guarding.


MUSCULOSKELETAL: Extremities without clubbing, cyanosis, or edema. No joint 

tenderness, effusion, or edema noted. No calf tenderness. Negative Homans sign 

bilaterally.


NEUROLOGICAL: Awake and alert. Cranial nerves II through XII intact.  Motor and 

sensory grossly within normal limits. Five out of 5 muscle strength in all 

muscle groups.  Normal speech.


Hospital Course


Patient presented with CHF exacerbation, BNP up to 4500.  Improved with 

diuresis.  Cardiology was consulted.  Echocardiogram shows ejection fraction 10-

15%.  Patient was started on medical management which will need to continue.  

Echocardiogram also found Apical thrombus for which patient was started on 

heparin drip and transitioned to warfarin.  Patient will need to continue on 

warfarin as outpatient.  Cardiomyopathy is likely nonischemic in origin 

secondary to chronic alcohol use.  Patient was counseled on alcohol cessation.  

patient was explicitly told that alcohol was the likely cause of his heart 

problem.  Patient conveyed understanding.





For problem based summary from most recent progress note, please see below.





//Acute systolic CHF ( with sob, and BNP of 4500/ cardiomegaly and pulmonary 

edema on CXR).  Improving continue diuresis and start Coreg.  Consider ACE 

inhibitor.  CHF education, I/O and monitor weight.  Monitor renal function and 

electrolytes.  Consult cardiology.


= Cardiology following.  Appreciate assistance.





//Hyperkalemia.  5.5.  Likely secondary to hemolysis.  Kayexalate. 


= Repeat potassium today is 4.1.  Was likely secondary to hemolysis.  Continue 

to monitor intermittently.





//Apical thrombus secondary to low ejection fraction.  Start anticoagulation 

with heparin drip.  Start Coumadin if okay with cardiology patient educated


elevated LFT's- due to alcohol/ CHF.  Negative hepatitis profile.  Liver 

sonogram noted.  Monitor


-INR 2.1.  Discharge home on warfarin.  Discussed with patient the absolute 

necessity for follow-up with primary care to check INR.  Patient conveys 

understanding.





//Bronchitis.  Continue Zithromax and nebulization.





//Heartburn.  On telemetry reflux mechanics are discussed with the patient 

start PPI and antacids





//Chronic low back pain.  Symptomatic treatment





//Tobacco, cocaine abuse.  Cessation counseling discussed.





//DVT prophylaxis with SCD and early ambulation


Discharge Planning


Discharge home.  Follow-up primary care and cardiology.


Pt Condition on Discharge:  Stable


Discharge Disposition:  Discharge Home


Discharge Time:  > 30 minutes


Discharge Instructions


DIET: Follow Instructions for:  Heart Healthy Diet


Activities you can perform:  Regular-No Restrictions


Activities to Avoid:  Driving


Follow up Referrals:  


Cardiology - 3 Weeks with Tavo Gann MD


PCP Follow-up - 1 Week with Zoey Ochoa





New Medications:  


Carvedilol (Coreg) 3.125 Mg Tab


3.125 MG PO Q12HR for heart for 30 Days, TAB





Enalapril (Enalapril) 2.5 Mg Tab


2.5 MG PO DAILY for heart for 30 Days, #30 TAB





Furosemide (Furosemide) 20 Mg Tab


20 MG PO DAILY for heart for 30 Days, #30 TAB





Warfarin (Coumadin) 5 Mg Tab


5 MG PO DAILY@1600 for Blood Clot Prevention for 30 Days, TAB


You need to follow with primary Care weekly to avoid dangerous levels


 of this medication


 


Continued Medications:  


Albuterol 18 GM Inh (Ventolin Hfa 18 GM Inh) 90 Mcg/Act Aer


1 PUFF INH Q4H PRN for SHORTNESS OF BREATH for 30 Days, #1 INHALER 0 Refills (

This prescription has been renewed)





Clonazepam (Klonopin) 0.5 Mg Tab


0.5 MG PO BID PRN for SPASM, #10 TAB 0 Refills





 


Discontinued Medications:  


Azithromycin (Zithromax Z-Robin) 250 Mg Dspk


250 MG PO AS DIRECTED for Infection, #1 DSPK 0 Refills


500 MG (2 tabs) day 1, then 1 tab days 2-5.














Nelson Martínez MD May 31, 2018 09:30

## 2018-05-31 NOTE — HHI.PR
Subjective


Remarks


Patient says he is feeling well..  Again denies any chest pain or shortness of 

breath.  Says he is looking forward to going home.





Objective





Vital Signs








  Date Time  Temp Pulse Resp B/P (MAP) Pulse Ox O2 Delivery O2 Flow Rate FiO2


 


5/31/18 05:30 97.7 85 16 122/69 (86) 98   


 


5/31/18 03:41  75      


 


5/31/18 00:00      Room Air  


 


5/31/18 00:00 98.5 77 20 98/57 (71) 97   


 


5/30/18 23:39  76      


 


5/30/18 20:00      Room Air  


 


5/30/18 20:00 98.5 86 20 101/55 (70) 98   


 


5/30/18 19:56  90      


 


5/30/18 16:56   18     


 


5/30/18 16:01 98.2 82 18 120/70 (87) 96   


 


5/30/18 16:00  88      


 


5/30/18 13:34     98   21


 


5/30/18 13:09     100   


 


5/30/18 12:01 98.3 94 18 101/81 (88) 96   


 


5/30/18 12:00  75      














I/O      


 


 5/30/18 5/30/18 5/30/18 5/31/18 5/31/18 5/31/18





 07:00 15:00 23:00 07:00 15:00 23:00


 


Intake Total 600 ml  420 ml 1721 ml  


 


Output Total 875 ml  1200 ml 1750 ml  


 


Balance -275 ml  -780 ml -29 ml  


 


      


 


Intake Oral 600 ml  420 ml 1400 ml  


 


IV Total    321 ml  


 


Output Urine Total 875 ml  1200 ml 1750 ml  


 


# Bowel Movements 0  1 0  








Result Diagram:  


5/29/18 0520                                                                   

             5/30/18 0352





Objective Remarks


GENERAL: Patient sitting up in bed.  Appears comfortable. no change on exam.  

Alert and oriented 3.


SKIN: Warm and dry.


HEAD: Normocephalic.


EYES: No scleral icterus. No injection or drainage. 


NECK: Supple, trachea midline. No JVD.


CARDIOVASCULAR: Regular rate and rhythm without murmurs, gallops, or rubs. 


RESPIRATORY: Breath sounds equal bilaterally. No accessory muscle use.


GASTROINTESTINAL: Abdomen soft, non-tender, nondistended. 


MUSCULOSKELETAL: No cyanosis, or edema. 


BACK: Nontender without obvious deformity. No CVA tenderness.








A/P


Assessment and Plan


//Acute systolic CHF ( with sob, and BNP of 4500/ cardiomegaly and pulmonary 

edema on CXR).  Improving continue diuresis and start Coreg.  Consider ACE 

inhibitor.  CHF education, I/O and monitor weight.  Monitor renal function and 

electrolytes.  Consult cardiology.


= Cardiology following.  Appreciate assistance.





//Hyperkalemia.  5.5.  Likely secondary to hemolysis.  Kayexalate. 


= Repeat potassium today is 4.1.  Was likely secondary to hemolysis.  Continue 

to monitor intermittently.





//Apical thrombus secondary to low ejection fraction.  Start anticoagulation 

with heparin drip.  Start Coumadin if okay with cardiology patient educated


elevated LFT's- due to alcohol/ CHF.  Negative hepatitis profile.  Liver 

sonogram noted.  Monitor


-INR 2.1.  Discharge home on warfarin.  Discussed with patient the absolute 

necessity for follow-up with primary care to check INR.  Patient conveys 

understanding.





//Bronchitis.  Continue Zithromax and nebulization.





//Heartburn.  On telemetry reflux mechanics are discussed with the patient 

start PPI and antacids





//Chronic low back pain.  Symptomatic treatment





//Tobacco, cocaine abuse.  Cessation counseling discussed.





//DVT prophylaxis with SCD and early ambulation


Discharge Planning


Discharge home.  Follow-up primary care and cardiology.











Nelson Martínez MD May 31, 2018 09:27

## 2018-06-16 ENCOUNTER — HOSPITAL ENCOUNTER (INPATIENT)
Dept: HOSPITAL 17 - NEPC | Age: 53
LOS: 4 days | Discharge: HOME | DRG: 292 | End: 2018-06-20
Attending: FAMILY MEDICINE | Admitting: FAMILY MEDICINE
Payer: COMMERCIAL

## 2018-06-16 VITALS
OXYGEN SATURATION: 100 % | DIASTOLIC BLOOD PRESSURE: 83 MMHG | HEART RATE: 87 BPM | SYSTOLIC BLOOD PRESSURE: 123 MMHG | RESPIRATION RATE: 14 BRPM

## 2018-06-16 VITALS
TEMPERATURE: 98.7 F | HEART RATE: 92 BPM | RESPIRATION RATE: 20 BRPM | SYSTOLIC BLOOD PRESSURE: 141 MMHG | OXYGEN SATURATION: 100 % | DIASTOLIC BLOOD PRESSURE: 99 MMHG

## 2018-06-16 VITALS
TEMPERATURE: 97.9 F | HEART RATE: 74 BPM | SYSTOLIC BLOOD PRESSURE: 114 MMHG | RESPIRATION RATE: 18 BRPM | OXYGEN SATURATION: 96 % | DIASTOLIC BLOOD PRESSURE: 77 MMHG

## 2018-06-16 VITALS
RESPIRATION RATE: 18 BRPM | OXYGEN SATURATION: 95 % | DIASTOLIC BLOOD PRESSURE: 99 MMHG | HEART RATE: 87 BPM | TEMPERATURE: 97.7 F | SYSTOLIC BLOOD PRESSURE: 147 MMHG

## 2018-06-16 VITALS — HEART RATE: 62 BPM

## 2018-06-16 VITALS — HEIGHT: 69 IN | WEIGHT: 115.96 LBS | BODY MASS INDEX: 17.18 KG/M2

## 2018-06-16 VITALS — OXYGEN SATURATION: 99 %

## 2018-06-16 VITALS
SYSTOLIC BLOOD PRESSURE: 114 MMHG | HEART RATE: 80 BPM | DIASTOLIC BLOOD PRESSURE: 75 MMHG | RESPIRATION RATE: 16 BRPM | OXYGEN SATURATION: 98 % | TEMPERATURE: 98.1 F

## 2018-06-16 VITALS — RESPIRATION RATE: 18 BRPM | OXYGEN SATURATION: 96 %

## 2018-06-16 VITALS — HEART RATE: 83 BPM

## 2018-06-16 DIAGNOSIS — F12.90: ICD-10-CM
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
## 2018-06-16 DIAGNOSIS — Z91.5: ICD-10-CM

## 2018-06-16 DIAGNOSIS — I42.6: ICD-10-CM

## 2018-06-16 DIAGNOSIS — Z79.01: ICD-10-CM

## 2018-06-16 DIAGNOSIS — Z59.0: ICD-10-CM

## 2018-06-16 DIAGNOSIS — I50.23: ICD-10-CM

## 2018-06-16 DIAGNOSIS — I49.3: ICD-10-CM

## 2018-06-16 DIAGNOSIS — F41.9: ICD-10-CM

## 2018-06-16 DIAGNOSIS — I08.3: ICD-10-CM

## 2018-06-16 DIAGNOSIS — Z81.1: ICD-10-CM

## 2018-06-16 DIAGNOSIS — F11.20: ICD-10-CM

## 2018-06-16 DIAGNOSIS — Z82.49: ICD-10-CM

## 2018-06-16 DIAGNOSIS — I11.0: Primary | ICD-10-CM

## 2018-06-16 DIAGNOSIS — G31.2: ICD-10-CM

## 2018-06-16 DIAGNOSIS — M62.838: ICD-10-CM

## 2018-06-16 DIAGNOSIS — Z91.14: ICD-10-CM

## 2018-06-16 DIAGNOSIS — F10.20: ICD-10-CM

## 2018-06-16 DIAGNOSIS — E78.00: ICD-10-CM

## 2018-06-16 DIAGNOSIS — F17.210: ICD-10-CM

## 2018-06-16 DIAGNOSIS — I51.3: ICD-10-CM

## 2018-06-16 DIAGNOSIS — F32.9: ICD-10-CM

## 2018-06-16 DIAGNOSIS — R74.8: ICD-10-CM

## 2018-06-16 DIAGNOSIS — R64: ICD-10-CM

## 2018-06-16 DIAGNOSIS — F90.9: ICD-10-CM

## 2018-06-16 DIAGNOSIS — G25.81: ICD-10-CM

## 2018-06-16 DIAGNOSIS — I48.91: ICD-10-CM

## 2018-06-16 DIAGNOSIS — F39: ICD-10-CM

## 2018-06-16 DIAGNOSIS — F20.9: ICD-10-CM

## 2018-06-16 LAB
ALBUMIN SERPL-MCNC: 2.5 GM/DL (ref 3.4–5)
ALP SERPL-CCNC: 110 U/L (ref 45–117)
ALT SERPL-CCNC: 21 U/L (ref 12–78)
AST SERPL-CCNC: 21 U/L (ref 15–37)
BASOPHILS # BLD AUTO: 0 TH/MM3 (ref 0–0.2)
BASOPHILS NFR BLD: 0.7 % (ref 0–2)
BILIRUB SERPL-MCNC: 0.1 MG/DL (ref 0.2–1)
BUN SERPL-MCNC: 17 MG/DL (ref 7–18)
CALCIUM SERPL-MCNC: 7.8 MG/DL (ref 8.5–10.1)
CHLORIDE SERPL-SCNC: 114 MEQ/L (ref 98–107)
CREAT SERPL-MCNC: 0.92 MG/DL (ref 0.6–1.3)
EOSINOPHIL # BLD: 0 TH/MM3 (ref 0–0.4)
EOSINOPHIL NFR BLD: 0.5 % (ref 0–4)
ERYTHROCYTE [DISTWIDTH] IN BLOOD BY AUTOMATED COUNT: 16.6 % (ref 11.6–17.2)
GFR SERPLBLD BASED ON 1.73 SQ M-ARVRAT: 87 ML/MIN (ref 89–?)
GLUCOSE SERPL-MCNC: 102 MG/DL (ref 74–106)
HCO3 BLD-SCNC: 23.8 MEQ/L (ref 21–32)
HCT VFR BLD CALC: 32.7 % (ref 39–51)
HGB BLD-MCNC: 10.6 GM/DL (ref 13–17)
INR PPP: 1.1 RATIO
LYMPHOCYTES # BLD AUTO: 0.5 TH/MM3 (ref 1–4.8)
LYMPHOCYTES NFR BLD AUTO: 8.7 % (ref 9–44)
MAGNESIUM SERPL-MCNC: 2 MG/DL (ref 1.5–2.5)
MCH RBC QN AUTO: 28.8 PG (ref 27–34)
MCHC RBC AUTO-ENTMCNC: 32.3 % (ref 32–36)
MCV RBC AUTO: 89.2 FL (ref 80–100)
MONOCYTE #: 0.5 TH/MM3 (ref 0–0.9)
MONOCYTES NFR BLD: 7.5 % (ref 0–8)
NEUTROPHILS # BLD AUTO: 5.1 TH/MM3 (ref 1.8–7.7)
NEUTROPHILS NFR BLD AUTO: 82.6 % (ref 16–70)
PLATELET # BLD: 305 TH/MM3 (ref 150–450)
PMV BLD AUTO: 7.5 FL (ref 7–11)
PROT SERPL-MCNC: 5.7 GM/DL (ref 6.4–8.2)
PROTHROMBIN TIME: 10.7 SEC (ref 9.8–11.6)
RBC # BLD AUTO: 3.67 MIL/MM3 (ref 4.5–5.9)
SODIUM SERPL-SCNC: 145 MEQ/L (ref 136–145)
TROPONIN I SERPL-MCNC: 0.07 NG/ML (ref 0.02–0.05)
TROPONIN I SERPL-MCNC: 0.08 NG/ML (ref 0.02–0.05)
TROPONIN I SERPL-MCNC: 0.25 NG/ML (ref 0.02–0.05)
WBC # BLD AUTO: 6.2 TH/MM3 (ref 4–11)

## 2018-06-16 PROCEDURE — 93306 TTE W/DOPPLER COMPLETE: CPT

## 2018-06-16 PROCEDURE — 80048 BASIC METABOLIC PNL TOTAL CA: CPT

## 2018-06-16 PROCEDURE — 80053 COMPREHEN METABOLIC PANEL: CPT

## 2018-06-16 PROCEDURE — 82552 ASSAY OF CPK IN BLOOD: CPT

## 2018-06-16 PROCEDURE — 85025 COMPLETE CBC W/AUTO DIFF WBC: CPT

## 2018-06-16 PROCEDURE — 84484 ASSAY OF TROPONIN QUANT: CPT

## 2018-06-16 PROCEDURE — 85027 COMPLETE CBC AUTOMATED: CPT

## 2018-06-16 PROCEDURE — 85610 PROTHROMBIN TIME: CPT

## 2018-06-16 PROCEDURE — 83880 ASSAY OF NATRIURETIC PEPTIDE: CPT

## 2018-06-16 PROCEDURE — 83605 ASSAY OF LACTIC ACID: CPT

## 2018-06-16 PROCEDURE — 82550 ASSAY OF CK (CPK): CPT

## 2018-06-16 PROCEDURE — 70450 CT HEAD/BRAIN W/O DYE: CPT

## 2018-06-16 PROCEDURE — 94150 VITAL CAPACITY TEST: CPT

## 2018-06-16 PROCEDURE — 80307 DRUG TEST PRSMV CHEM ANLYZR: CPT

## 2018-06-16 PROCEDURE — 83735 ASSAY OF MAGNESIUM: CPT

## 2018-06-16 PROCEDURE — 82140 ASSAY OF AMMONIA: CPT

## 2018-06-16 PROCEDURE — 96374 THER/PROPH/DIAG INJ IV PUSH: CPT

## 2018-06-16 PROCEDURE — 96375 TX/PRO/DX INJ NEW DRUG ADDON: CPT

## 2018-06-16 PROCEDURE — 71045 X-RAY EXAM CHEST 1 VIEW: CPT

## 2018-06-16 PROCEDURE — 93005 ELECTROCARDIOGRAM TRACING: CPT

## 2018-06-16 PROCEDURE — 85730 THROMBOPLASTIN TIME PARTIAL: CPT

## 2018-06-16 RX ADMIN — FOLIC ACID SCH MG: 1 TABLET ORAL at 13:00

## 2018-06-16 RX ADMIN — MULTIPLE VITAMINS W/ MINERALS TAB SCH TAB: TAB at 13:00

## 2018-06-16 RX ADMIN — CARVEDILOL SCH MG: 3.12 TABLET, FILM COATED ORAL at 22:18

## 2018-06-16 RX ADMIN — ENALAPRIL MALEATE SCH MG: 2.5 TABLET ORAL at 12:00

## 2018-06-16 RX ADMIN — POTASSIUM CHLORIDE SCH MEQ: 20 TABLET, EXTENDED RELEASE ORAL at 22:17

## 2018-06-16 RX ADMIN — ENOXAPARIN SODIUM SCH MG: 60 INJECTION SUBCUTANEOUS at 22:17

## 2018-06-16 RX ADMIN — Medication SCH ML: at 22:17

## 2018-06-16 RX ADMIN — Medication SCH MG: at 13:00

## 2018-06-16 SDOH — ECONOMIC STABILITY - HOUSING INSECURITY: HOMELESSNESS: Z59.0

## 2018-06-16 NOTE — RADRPT
EXAM DATE:  6/16/2018 7:43 AM EDT

AGE/SEX:        52 years / Male



INDICATIONS:  Short of breath



CLINICAL DATA:  This is the patient's initial encounter. Patient reports that signs and symptoms have
 been present for 1 day and indicates a pain score of Nonresponsive. 

                                                                          

MEDICAL/SURGICAL HISTORY:       Hypertension. Atrial fibrillation  Non-responsive.



COMPARISON:      [facility] [place], CHEST SINGLE AP, 5/23/2018.  . 





FINDINGS:  

Portable AP view of the chest demonstrates mildly enlarged cardiac silhouette. There are mild interst
itial opacities in the lower lung zones bilaterally. No pleural effusion or pneumothorax is identifie
d. The bones and soft tissues demonstrate no acute abnormality.



CONCLUSION: 

Mild interstitial changes in the lower lung zones in a pattern suggesting interstitial pulmonary georgina
a. Given the enlarged cardiac silhouette a cardiogenic cause is likely.



Electronically signed by: Guicho Ruiz MD  6/16/2018 7:47 AM EDT

## 2018-06-16 NOTE — HHI.HP
HPI


Service


Family Medicine


Primary Care Physician


Unknown


Admission Diagnosis





CHF, cardiomyopathy, atrial thrombus, elevated troponin


Diagnoses:  


International Travel<30 Days:  No


Contact w/Intl Traveler<30days:  No


Known Affected Area:  No


History of Present Illness


Mr. Christensen is a 53 y/o M presenting to the ED with URI symptoms and altered mental 

status.  History is difficult to obtain secondary to altered mental status and 

therefore is combined with ER physician sign out.  Per report, patient was 

recently discharged on 18 for admission secondary to CHF exacerbation.  He 

was diuresed and found to have an echocardiogram showing an ejection fraction 

of 10-15%.  Echocardiogram also found an apical thrombus for which he was 

started on Coumadin at that time.  His cardiomyopathy likely secondary to 

alcohol abuse was diagnosed as nonischemic and he was discharged on Lasix, 

cerebral, and Coreg.  Since that time he has been noncompliant with all 

medications.  Patient states that he is homeless and has no financial 

assistance to obtain medications at this time.  Today he presents with 

generalized weakness and altered mental status.  Patient states that his mental 

status changes secondary to 4 sleepless nights recently.  In the review of 

systems he does endorse shortness of breath with nonproductive cough as well as 

substernal chest pressure with breathing.  He denies any diaphoresis, nausea, 

or radiation of the neck or down the arm but did have one episode of nonbloody 

vomiting.  He states that this morning when he received his morphine and 

improved his overall symptoms.  Otherwise he has no complaints at this time.


 (Fei Hastings MD R2)





Review of Systems


ROS Limitations:  Clinical Condition


Constitutional:  DENIES: Fever, Chills


Endocrine:  DENIES: Polyphagia


Eyes:  DENIES: Blurred vision, Double Vision


Ears, nose, mouth, throat:  DENIES: Throat pain, Running Nose


Respiratory:  COMPLAINS OF: Cough, Shortness of breath, DENIES: Sputum 

production


Cardiovascular:  COMPLAINS OF: Chest pain, Dyspnea on Exertion


Gastrointestinal:  COMPLAINS OF: Vomiting, DENIES: Abdominal pain, Constipation

, Diarrhea, Nausea


Genitourinary:  DENIES: Hematuria, Dysuria


Musculoskeletal:  COMPLAINS OF: Joint pain, Muscle aches


Integumentary:  DENIES: Rash


Hematologic/lymphatic:  DENIES: Lymphadenopathy


Immunologic/allergic:  DENIES: Urticaria


Neurologic:  DENIES: Headache


Psychiatric:  DENIES: Mood changes, Depression (Fei Hastings MD R2)





Past Family Social History


Past Medical History


Reported:


Depression


ADHD


Restless leg syndrome





Per chart review:


Alcohol abuse


Prior suicide attempt


CHF


Apical cardiac thrombus


Atrial fibrillation


Schizophrenia, not on medications


GI ulceration


Past Surgical History


Per report: L elbow fixation





Per chart review: Chest tube placement


 (Fei Hastings MD R2)


Allergies:  


Coded Allergies:  


     No Known Allergies (Verified  Allergy, Unknown, 18)


Family History


Mother - unknown malignancy, 


Father - CAD, 


Siblings - one brother  from CAD, alcoholism


Social History


Patient is currently homeless and without financial assistance for medications.


Alcohol - history of abuse, drinks 1 time a week up to 1L per time, otherwise 

drinks 1/2 per day, Last drink 2 weeks 


Tobacco - 1/2ppd for 36 years


Illicit -Marijuana use, otherwise no history reported


 (Fei Hastings MD R2)





Physical Exam


Vital Signs





Vital Signs








  Date Time  Temp Pulse Resp B/P (MAP) Pulse Ox O2 Delivery O2 Flow Rate FiO2


 


18 08:27   18  96   


 


18 06:50 97.5 92 20 141/99 (113) 100   








Physical Exam


GENERAL: Thin appearing disheveled male lying in bed in no acute distress.


SKIN: Warm and dry. No rash.  Hyperpigmented.


HEENT: Atraumatic, normocephalic with extraocular motions intact.  PERRLA, 

mildly. Oropharynx clear without erythema or exudate.  No rhinorrhea.  No 

palpable LAD, JVD, or thyroid abnormality.


CARDIOVASCULAR: Regular rate and rhythm without obvious murmurs, gallops, or 

rubs. 2+ pulses in all four extremities. 


RESPIRATORY: Fine crackles at bilateral bases with good aeration otherwise.  No 

increased work of breathing at this time on room air.  Able to converse in full 

complete sentences.


GASTROINTESTINAL: Abdomen soft, non-tender, nondistended with positive bowel 

sounds. No masses appreciated.


MUSCULOSKELETAL: No cyanosis or edema. No calf tenderness.  Ambulating without 

assistance.


NEURO/PSYCH: Neurological exam afocal.  AAO 3.  Speech is slowed with patient 

intermittently sleeping during exam/interview.


Laboratory





Laboratory Tests








Test


  18


07:44 18


08:55


 


White Blood Count 6.2  


 


Red Blood Count 3.67  


 


Hemoglobin 10.6  


 


Hematocrit 32.7  


 


Mean Corpuscular Volume 89.2  


 


Mean Corpuscular Hemoglobin 28.8  


 


Mean Corpuscular Hemoglobin


Concent 32.3 


  


 


 


Red Cell Distribution Width 16.6  


 


Platelet Count 305  


 


Mean Platelet Volume 7.5  


 


Neutrophils (%) (Auto) 82.6  


 


Lymphocytes (%) (Auto) 8.7  


 


Monocytes (%) (Auto) 7.5  


 


Eosinophils (%) (Auto) 0.5  


 


Basophils (%) (Auto) 0.7  


 


Neutrophils # (Auto) 5.1  


 


Lymphocytes # (Auto) 0.5  


 


Monocytes # (Auto) 0.5  


 


Eosinophils # (Auto) 0.0  


 


Basophils # (Auto) 0.0  


 


CBC Comment DIFF FINAL  


 


Differential Comment   


 


Prothrombin Time 10.7  


 


Prothromb Time International


Ratio 1.1 


  


 


 


Activated Partial


Thromboplast Time 25.7 


  


 


 


Blood Urea Nitrogen 17  


 


Creatinine 0.92  


 


Random Glucose 102  


 


Total Protein 5.7  


 


Albumin 2.5  


 


Calcium Level 7.8  


 


Magnesium Level 2.0  


 


Alkaline Phosphatase 110  


 


Aspartate Amino Transf


(AST/SGOT) 21 


  


 


 


Alanine Aminotransferase


(ALT/SGPT) 21 


  


 


 


Total Bilirubin 0.1  


 


Sodium Level 145  


 


Potassium Level 4.0  


 


Chloride Level 114  


 


Carbon Dioxide Level 23.8  


 


Anion Gap 7  


 


Estimat Glomerular Filtration


Rate 87 


  


 


 


Lactic Acid Level 1.3  


 


Total Creatine Kinase 280  


 


Creatine Kinase MB 7.5  


 


Troponin I 0.08  


 


B-Type Natriuretic Peptide 2128  


 


Ethyl Alcohol Level LESS THAN 3  


 


Ammonia  24 








 (Fei Hastings MD R2)


Result Diagram:  


18 0744                                                                   

             18 0744





Imaging





Last 72 hours Impressions








Chest X-Ray 18 0725 Signed





Impressions: 





 CONCLUSION: 





 Mild interstitial changes in the lower lung zones in a pattern suggesting inter





 stitial pulmonary edema. Given the enlarged cardiac silhouette a cardiogenic ca





 use is likely.





  





 


 


Head CT 18 0000 Signed





Impressions: 





 CONCLUSION:





 No acute intracranial abnormality is identified.





  





 








 (Fei Hastings MD R2)





Caprini VTE Risk Assessment


Caprini VTE Risk Assessment:  Mod/High Risk (score >= 2)


Caprini Risk Assessment Model











 Point Value = 1          Point Value = 2  Point Value = 3  Point Value = 5


 


Age 41-60


Minor surgery


BMI > 25 kg/m2


Swollen legs


Varicose veins


Pregnancy or postpartum


History of unexplained or recurrent


   spontaneous 


Oral contraceptives or hormone


   replacement


Sepsis (< 1 month)


Serious lung disease, including


   pneumonia (< 1 month)


Abnormal pulmonary function


Acute myocardial infarction


Congestive heart failure (< 1 month)


History of inflammatory bowel disease


Medical patient at bed rest Age 61-74


Arthroscopic surgery


Major open surgery (> 45 min)


Laparoscopic surgery (> 45 min)


Malignancy


Confined to bed (> 72 hours)


Immobilizing plaster cast


Central venous access Age >= 75


History of VTE


Family history of VTE


Factor V Leiden


Prothrombin 67697B


Lupus anticoagulant


Anticardiolipin antibodies


Elevated serum homocysteine


Heparin-induced thrombocytopenia


Other congenital or acquired


   thrombophilia Stroke (< 1 month)


Elective arthroplasty


Hip, pelvis, or leg fracture


Acute spinal cord injury (< 1 month)








Prophylaxis Regimen











   Total Risk


Factor Score Risk Level Prophylaxis Regimen


 


0-1      Low Early ambulation


 


2 Moderate Order ONE of the following:


*Sequential Compression Device (SCD)


*Heparin 5000 units SQ BID


 


3-4 Higher Order ONE of the following medications:


*Heparin 5000 units SQ TID


*Enoxaparin/Lovenox 40 mg SQ daily (WT < 150 kg, CrCl > 30 mL/min)


*Enoxaparin/Lovenox 30 mg SQ daily (WT < 150 kg, CrCl > 10-29 mL/min)


*Enoxaparin/Lovenox 30 mg SQ BID (WT < 150 kg, CrCl > 30 mL/min)


AND/OR


*Sequential Compression Device (SCD)


 


5 or more Highest Order ONE of the following medications:


*Heparin 5000 units SQ TID (Preferred with Epidurals)


*Enoxaparin/Lovenox 40 mg SQ daily (WT < 150 kg, CrCl > 30 mL/min)


*Enoxaparin/Lovenox 30 mg SQ daily (WT < 150 kg, CrCl > 10-29 mL/min)


*Enoxaparin/Lovenox 30 mg SQ BID (WT < 150 kg, CrCl > 30 mL/min)


AND


*Sequential Compression Device (SCD)








 (Fei Hastings MD R2)





Assessment and Plan


Assessment and Plan


Mr. Christensen is a 52-year-old male admitted for acute on chronic CHF exacerbation.


Code Status


Full code


Discussed Condition With


Dr. Guo, ER physician


 (Fei Hastings MD R2)


Attending Attestation


Patient seen and examined.  Case reviewed and discussed with the resident team.

  Agree with plan of care as discussed with me and documented in the resident 

note.


seen day of admission in his room and was terrible historian at that time


 (Viv Shane MD)


Problem List:  


(1) Acute on chronic congestive heart failure


ICD Codes:  I50.9 - Heart failure, unspecified


Status:  Acute


Plan:  Patient with history of CHF presenting with acute exacerbation.





-Chest x-ray: Mild interstitial changes and lower lung zones suggesting 

interstitial pulmonary edema.  Enlarged cardiac silhouette.


-EKG: Sinus rhythm with PVCs.  Inverted T waves of lead 5, V6, 1, and aVL.  No 

ST elevation/depression.  (Per medical team read)


-BNP: 2128


-CK-MB: 7.5


-Troponin: 0.08


-CMP: Electrolytes within normal limits





-Repeat echocardiogram ordered


-Trend EKG, CK, troponin 3


-Consult cardiology, appreciate recommendations





Medications:


-Morphine 4 mg, aspirin 162 mg, and Lasix 40 mg IV given once in ED


-Continue home carvedilol, enalapril, aspirin, and Lasix 


-Potassium 20 mEq daily





(2) Thrombus in heart chamber


ICD Codes:  I51.3 - Intracardiac thrombosis, not elsewhere classified


Status:  Acute


Plan:  Patient with recent hospitalization showing apical cardiac thrombus on 

echocardiogram.





-Echo 18: Moderately dilated left ventricle with normal wall thickness.  

EF of 10-15%.  Basal posterior and basal lateral walls are mildly hypokinetic 

in all other segments are severely hypokinetic.  A 1.6 x 1.9 cm ovoid 

echodensity at the apex consistent with apical thrombus.


-Due to patient's history of alcohol abuse with increased risk of falls, 

patient is at moderate risk for bleeding with HAS-BLED score of 2.  Once mental 

status improved, medical team to have discussions on chronic anticoagulation.





-Repeat echocardiogram ordered





Medications:


-Anticoagulation with Lovenox twice daily (1 mg per Kg)





(3) Atrial fibrillation


ICD Codes:  I48.91 - Unspecified atrial fibrillation


Status:  Chronic


Plan:  Patient with reported history of atrial fibrillation





-EKG: Pending





Medications:


-Continue home Coreg


-Patient anticoagulated on Lovenox at this time





(4) Altered mental status


ICD Codes:  R41.82 - Altered mental status


Status:  Acute


Plan:  Patient with altered mental status on exam secondary to alcoholism 

versus electrolyte abnormality versus fatigue.  Patient also with history of 

mood disorder per chart review.





-CIWA protocol in place


-Swallow study


-Continue to monitor, low threshold for psychiatric evaluation





(5) Alcoholism


ICD Codes:  F10.20 - Alcoholism


Status:  Chronic


Plan:  Patient with reported history of alcoholism with last drink per report 2 

weeks ago





-Ethanol: Less than 3





Medications:


-CIWA protocol in place


-Daily multivitamin, folic acid, and thiamine





(6) Nausea


ICD Codes:  R11.0 - Nausea


Status:  Acute


Plan:  Patient reporting nausea with one episode of vomiting approximately 2 

days ago that was nonbloody.





Medications:


-Zofran as needed for nausea/vomiting


-Reglan given once in ED





(7) Spasm


ICD Codes:  R25.2 - Cramp and spasm


Status:  Chronic


Plan:  Patient with reported history of chronic muscle spasms





-CMP: Electrolytes within normal limits





Medications:


-Continue home clonazepam 0.5 mg twice daily as needed for spasm





(8) Nutrition, metabolism, and development symptoms


ICD Codes:  R63.8 - Symptoms concerning nutrition, metabolism, and development


Status:  Acute


Plan:  -Diet: N.p.o. until swallow evaluation, otherwise advance as tolerated


-Fluids: Patient appears hydrated, continue to monitor with diuresis


-Electrodes: Within normal limits, continue to monitor


-Prophylaxis: DuoNeb's as needed for shortness of breath/wheezing, clonidine as 

needed for blood pressure greater than 180/110, Zofran as needed for nausea/

vomiting, CIWA protocol in place, famotidine twice daily as needed for reflux





(9) DVT prophylaxis


Status:  Acute


Plan:  -SCDs


-Medications: Lovenox twice daily for cardiac thrombus


 (Fei Hastings MD R2)





Physician Certification


2 Midnight Certification Type:  Admission for Inpatient Services


Order for Inpatient Services


The services are ordered in accordance with Medicare regulations or non-

Medicare payer requirements, as applicable.  In the case of services not 

specified as inpatient-only, they are appropriately provided as inpatient 

services in accordance with the 2-midnight benchmark.


Estimated LOS (days):  3


3 days is the estimated time the patient will need to remain in the hospital, 

assuming treatment plan goals are met and no additional complications.


Post-Hospital Plan:  Home


 (Fei Hastings MD R2)





Problem Qualifiers





(1) Acute on chronic congestive heart failure:  


Qualified Codes:  I50.23 - Acute on chronic systolic (congestive) heart failure


(2) Atrial fibrillation:  


Qualified Codes:  I48.91 - Unspecified atrial fibrillation


(3) Altered mental status:  


Qualified Codes:  R41.82 - Altered mental status, unspecified








Fei Hastings MD R2 2018 10:27


Viv Shane MD 2018 15:52

## 2018-06-16 NOTE — RADRPT
EXAM DATE:  6/16/2018 8:21 AM EDT

AGE/SEX:        52 years / Male



INDICATIONS:  Altered mental status.



CLINICAL DATA:  This is the patient's initial encounter. Patient reports that signs and symptoms have
 been present for 1 day and indicates a pain score of 6/10. 

                                                                          

MEDICAL/SURGICAL HISTORY:   Hypertension. None.



RADIATION DOSE:  36.17 CTDI (mGy)







COMPARISON:      [facility] [place], CT BRAIN W/O CONTRAST, 2/23/2018.  . 







TECHNIQUE:  CT of the head without contrast.  Using automated exposure control and adjustment of the 
mA and/or kV according to patient size, radiation dose was kept as low as reasonably achievable to ob
tain optimal diagnostic quality images.



FINDINGS: 

Cerebrum:  The ventricles are normal.  No midline shift, mass lesion, hemorrhage or acute infarction.
  No extraaxial fluid collections are seen.

Posterior Fossa:  The cerebellum and brainstem demonstrate no acute abnormality. The 4th ventricle is
 midline.  The cerebellopontine angle is within normal limits. 

Extracranial:  The visualized sinuses are clear. 

Skull:  The calvaria is intact.  No skull fracture.







CONCLUSION:

No acute intracranial abnormality is identified.



Electronically signed by: Guicho Ruiz MD  6/16/2018 8:28 AM EDT

## 2018-06-16 NOTE — MB
cc:

Tavo Gann MD

****

 

 

DATE:

06/16/2018

 

REASON FOR CONSULTATION:

Chest pain, recurrent congestive heart failure.

 

HISTORY OF PRESENT ILLNESS:

The patient is a fair to poor historian.  He is a 52-year-old white 

male with a history of severe dilated cardiomyopathy, ejection 

fraction 10-15%, likely nonischemic in origin, possibly 

alcohol-induced, history of left ventricular apical thrombus, opioid 

addiction, tobacco abuse, who came back to the hospital with vague 

complaints.  He states he felt uncomfortable last night, unable to lie

down.  The patient reports generalized weakness and fatigue.  In the 

last few days, he has noted some increase in baseline shortness of 

breath.  He denies paroxysmal nocturnal dyspnea, pedal edema, 

dizziness, syncope, near syncope, and palpitations.  Unfortunately, he

has not been taking his medications.  Chronically, he has intermittent

brief left "sharp" chest pains, never lasting more than a few minutes,

with absolutely no relationship to exertion.

 

PAST MEDICAL HISTORY:

As above.

 

CARDIAC MEDICATIONS AT HOME:

The patient is supposed to be taking carvedilol 3.125 mg b.i.d., 

enalapril 2.5 mg daily, furosemide 20 mg daily and warfarin 5 mg 

daily.  He has been noncompliant with the medications.

 

ALLERGIES:

NO KNOWN DRUG ALLERGIES.

 

FAMILY HISTORY:

Noncontributory.

 

SOCIAL HISTORY:

The patient smokes about a pack of cigarettes per day.  He states he 

has "cut down a lot" on his alcohol consumption.  He denies any recent

drug abuse.

 

REVIEW OF SYSTEMS:

As in the history of present illness, otherwise negative or 

noncontributory.  He also denies headache, abdominal pain, melena, 

dyspepsia, bright red blood per rectum, fevers, and flu symptoms.

 

PHYSICAL EXAMINATION:

VITAL SIGNS:  His blood pressure 114/77 with a pulse of 74, 

respirations 18.

GENERAL:  He is a well-developed, thin white male, in no acute 

distress.

NECK:  Jugular venous pressure is normal.  Carotid pulses are 2+ 

bilaterally and without bruits.

CHEST:  Reveals clear lungs fields.

CARDIAC:  He has a regular rhythm and rate with an S1, S2, S3.  No 

murmur is audible.

ABDOMEN:  He has a soft, nontender abdomen.  Bowel sounds are present.

 There is no definite hepatosplenomegaly.

EXTREMITIES:  Reveals no clubbing, cyanosis, or edema.

 

EKG:

Shows sinus rhythm, nonspecific ST abnormality.

 

LABORATORY DATA:

Includes WBC 6.2, hemoglobin 10.6, platelets 305.  potassium 4.0, BUN 

17, creatinine 0.92.  Troponin 0.08, .

 

IMAGING STUDIES:

Chest x-ray shows increased interstitial markings suggestive of 

pulmonary edema.

 

IMPRESSION:

Congestive heart failure exacerbation, mild, atypical chest pain in 

this 52-year-old white male with a history of severe dilated 

cardiomyopathy, ejection fraction 10-15%, probably nonischemic in 

origin, possibly alcohol induced.  His chest x-ray does suggest the 

possibility of recurrent congestive heart failure.  His chest pains 

are chronic and extremely atypical for myocardial ischemia.  I doubt 

the slight elevation in troponin is due to acute coronary syndrome.  

It may be due to congestive heart failure.  Unfortunately, he has been

noncompliant with his medications.  He is not a good candidate for 

invasive cardiac evaluation.

 

RECOMMENDATIONS:

1.  Continue medical therapy.

2.  Continuing warfarin in this patient with no promise of followup would be 

risky.  Would recommend a daily aspirin.

3.  Would recommend checking to see if the patient qualifies for some patient 

assistance with his medications or Medicaid.

4.  Mild diuresis.

5.  We will followup as needed.

 

 

__________________________________

MD DEMARIO Ashley/JOSE

D: 06/16/2018, 03:05 PM

T: 06/16/2018, 04:54 PM

Visit #: [id number]

Job #: [id number]

SOHEILA

## 2018-06-17 VITALS
OXYGEN SATURATION: 96 % | RESPIRATION RATE: 20 BRPM | DIASTOLIC BLOOD PRESSURE: 96 MMHG | TEMPERATURE: 97.1 F | SYSTOLIC BLOOD PRESSURE: 120 MMHG | HEART RATE: 90 BPM

## 2018-06-17 VITALS
SYSTOLIC BLOOD PRESSURE: 110 MMHG | RESPIRATION RATE: 18 BRPM | OXYGEN SATURATION: 98 % | HEART RATE: 90 BPM | DIASTOLIC BLOOD PRESSURE: 74 MMHG | TEMPERATURE: 98.8 F

## 2018-06-17 VITALS
RESPIRATION RATE: 18 BRPM | HEART RATE: 86 BPM | TEMPERATURE: 98.1 F | OXYGEN SATURATION: 96 % | DIASTOLIC BLOOD PRESSURE: 91 MMHG | SYSTOLIC BLOOD PRESSURE: 124 MMHG

## 2018-06-17 VITALS
DIASTOLIC BLOOD PRESSURE: 99 MMHG | SYSTOLIC BLOOD PRESSURE: 130 MMHG | HEART RATE: 89 BPM | TEMPERATURE: 97.1 F | RESPIRATION RATE: 20 BRPM | OXYGEN SATURATION: 96 %

## 2018-06-17 VITALS — HEART RATE: 96 BPM

## 2018-06-17 VITALS — HEART RATE: 102 BPM

## 2018-06-17 VITALS — OXYGEN SATURATION: 98 %

## 2018-06-17 VITALS
HEART RATE: 76 BPM | DIASTOLIC BLOOD PRESSURE: 83 MMHG | SYSTOLIC BLOOD PRESSURE: 107 MMHG | TEMPERATURE: 97.3 F | OXYGEN SATURATION: 99 % | RESPIRATION RATE: 16 BRPM

## 2018-06-17 VITALS
HEART RATE: 73 BPM | OXYGEN SATURATION: 96 % | RESPIRATION RATE: 20 BRPM | DIASTOLIC BLOOD PRESSURE: 82 MMHG | SYSTOLIC BLOOD PRESSURE: 113 MMHG | TEMPERATURE: 97.7 F

## 2018-06-17 VITALS — HEART RATE: 81 BPM

## 2018-06-17 VITALS — HEART RATE: 84 BPM

## 2018-06-17 LAB
ALBUMIN SERPL-MCNC: 2.3 GM/DL (ref 3.4–5)
ALP SERPL-CCNC: 104 U/L (ref 45–117)
ALT SERPL-CCNC: 21 U/L (ref 12–78)
AST SERPL-CCNC: 20 U/L (ref 15–37)
BASOPHILS # BLD AUTO: 0 TH/MM3 (ref 0–0.2)
BASOPHILS NFR BLD: 0.8 % (ref 0–2)
BILIRUB SERPL-MCNC: 0.1 MG/DL (ref 0.2–1)
BUN SERPL-MCNC: 15 MG/DL (ref 7–18)
CALCIUM SERPL-MCNC: 8.2 MG/DL (ref 8.5–10.1)
CHLORIDE SERPL-SCNC: 109 MEQ/L (ref 98–107)
CREAT SERPL-MCNC: 0.84 MG/DL (ref 0.6–1.3)
EOSINOPHIL # BLD: 0.2 TH/MM3 (ref 0–0.4)
EOSINOPHIL NFR BLD: 2.9 % (ref 0–4)
ERYTHROCYTE [DISTWIDTH] IN BLOOD BY AUTOMATED COUNT: 16.9 % (ref 11.6–17.2)
GFR SERPLBLD BASED ON 1.73 SQ M-ARVRAT: 97 ML/MIN (ref 89–?)
GLUCOSE SERPL-MCNC: 103 MG/DL (ref 74–106)
HCO3 BLD-SCNC: 28.1 MEQ/L (ref 21–32)
HCT VFR BLD CALC: 38.3 % (ref 39–51)
HGB BLD-MCNC: 12.2 GM/DL (ref 13–17)
INR PPP: 1 RATIO
LYMPHOCYTES # BLD AUTO: 0.9 TH/MM3 (ref 1–4.8)
LYMPHOCYTES NFR BLD AUTO: 17.2 % (ref 9–44)
MAGNESIUM SERPL-MCNC: 1.9 MG/DL (ref 1.5–2.5)
MCH RBC QN AUTO: 28.1 PG (ref 27–34)
MCHC RBC AUTO-ENTMCNC: 32 % (ref 32–36)
MCV RBC AUTO: 87.8 FL (ref 80–100)
MONOCYTE #: 0.4 TH/MM3 (ref 0–0.9)
MONOCYTES NFR BLD: 6.8 % (ref 0–8)
NEUTROPHILS # BLD AUTO: 4 TH/MM3 (ref 1.8–7.7)
NEUTROPHILS NFR BLD AUTO: 72.3 % (ref 16–70)
PLATELET # BLD: 353 TH/MM3 (ref 150–450)
PMV BLD AUTO: 7.5 FL (ref 7–11)
PROT SERPL-MCNC: 5.5 GM/DL (ref 6.4–8.2)
PROTHROMBIN TIME: 10.6 SEC (ref 9.8–11.6)
RBC # BLD AUTO: 4.36 MIL/MM3 (ref 4.5–5.9)
SODIUM SERPL-SCNC: 144 MEQ/L (ref 136–145)
TROPONIN I SERPL-MCNC: 0.49 NG/ML (ref 0.02–0.05)
TROPONIN I SERPL-MCNC: 0.6 NG/ML (ref 0.02–0.05)
WBC # BLD AUTO: 5.5 TH/MM3 (ref 4–11)

## 2018-06-17 RX ADMIN — ASPIRIN 81 MG SCH MG: 81 TABLET ORAL at 09:29

## 2018-06-17 RX ADMIN — ALUMINUM HYDROXIDE, MAGNESIUM HYDROXIDE, AND SIMETHICONE PRN ML: 200; 200; 20 SUSPENSION ORAL at 13:18

## 2018-06-17 RX ADMIN — FOLIC ACID SCH MG: 1 TABLET ORAL at 09:29

## 2018-06-17 RX ADMIN — CARVEDILOL SCH MG: 3.12 TABLET, FILM COATED ORAL at 21:35

## 2018-06-17 RX ADMIN — POTASSIUM CHLORIDE SCH MEQ: 20 TABLET, EXTENDED RELEASE ORAL at 21:35

## 2018-06-17 RX ADMIN — Medication SCH MG: at 09:29

## 2018-06-17 RX ADMIN — Medication SCH ML: at 09:30

## 2018-06-17 RX ADMIN — POTASSIUM CHLORIDE SCH MEQ: 20 TABLET, EXTENDED RELEASE ORAL at 09:28

## 2018-06-17 RX ADMIN — MULTIPLE VITAMINS W/ MINERALS TAB SCH TAB: TAB at 09:29

## 2018-06-17 RX ADMIN — ONDANSETRON PRN MG: 4 TABLET, ORALLY DISINTEGRATING ORAL at 21:44

## 2018-06-17 RX ADMIN — Medication SCH ML: at 21:36

## 2018-06-17 RX ADMIN — ENALAPRIL MALEATE SCH MG: 2.5 TABLET ORAL at 09:29

## 2018-06-17 RX ADMIN — CARVEDILOL SCH MG: 3.12 TABLET, FILM COATED ORAL at 09:29

## 2018-06-17 RX ADMIN — FAMOTIDINE PRN MG: 20 TABLET, FILM COATED ORAL at 21:48

## 2018-06-17 RX ADMIN — ENOXAPARIN SODIUM SCH MG: 60 INJECTION SUBCUTANEOUS at 21:35

## 2018-06-17 RX ADMIN — ENOXAPARIN SODIUM SCH MG: 60 INJECTION SUBCUTANEOUS at 09:29

## 2018-06-17 RX ADMIN — FUROSEMIDE SCH MG: 20 TABLET ORAL at 09:29

## 2018-06-17 NOTE — EKG
Date Performed: 06/16/2018       Time Performed: 16:18:05

 

PTAGE:      52 years

 

EKG:      Sinus rhythm 

 

 POSSIBLE LEFT ATRIAL ENLARGEMENT LEFT VENTRICULAR HYPERTROPHY AND ST-T CHANGE ABNORMAL ECG

 

PREVIOUS TRACING       : 06/16/2018 08.31 Since the previous tracing, no significant change noted

 

DOCTOR:   Corey Son  Interpretating Date/Time  06/17/2018 13:46:53

## 2018-06-17 NOTE — EKG
Date Performed: 06/16/2018       Time Performed: 22:31:26

 

PTAGE:      52 years

 

EKG:      Sinus rhythm 

 

 POSSIBLE LEFT ATRIAL ENLARGEMENT LEFT VENTRICULAR HYPERTROPHY AND ST-T CHANGE ABNORMAL ECG

 

PREVIOUS TRACING       : 06/16/2018 16.18 Since the previous tracing, no significant change noted

 

DOCTOR:   Corey Son  Interpretating Date/Time  06/17/2018 13:47:04

## 2018-06-17 NOTE — HHI.HP
[facility]


Service


Family Medicine


Primary Care Physician


Unknown


Admission Diagnosis





CHF, cardiomyopathy, atrial thrombus, elevated troponin


Diagnoses:  


(1) Acute on chronic congestive heart failure


Diagnosis:  Principal





(2) Thrombus in heart chamber


Diagnosis:  Principal





(3) Atrial fibrillation


Diagnosis:  Principal





(4) Altered mental status


Diagnosis:  Principal





(5) Alcoholism


Diagnosis:  Principal





(6) Nausea


Diagnosis:  Principal





(7) Spasm


Diagnosis:  Principal





(8) Nutrition, metabolism, and development symptoms


Diagnosis:  Principal





(9) DVT prophylaxis


Diagnosis:  Principal





International Travel<30 Days:  No


Contact w/Intl Traveler<30days:  No


Known Affected Area:  No


History of Present Illness


Mr. Christensen is a 51 y/o M presenting to the ED with URI symptoms and altered mental 

status.  History is difficult to obtain secondary to altered mental status and 

therefore is combined with ER physician sign out.  Per report, patient was 

recently discharged on 18 for admission secondary to CHF exacerbation.  He 

was diuresed and found to have an echocardiogram showing an ejection fraction 

of 10-15%.  Echocardiogram also found an apical thrombus for which he was 

started on Coumadin at that time.  His cardiomyopathy likely secondary to 

alcohol abuse was diagnosed as nonischemic and he was discharged on Lasix, 

cerebral, and Coreg.  Since that time he has been noncompliant with all 

medications.  Patient states that he is homeless and has no financial 

assistance to obtain medications at this time.  He presented with generalized 

weakness and altered mental status.  Patient states that his mental status 

changes secondary to 4 sleepless nights recently.  In the review of systems he 

does endorse shortness of breath with nonproductive cough as well as substernal 

chest pressure with breathing.  He denies any diaphoresis, nausea, or radiation 

of the neck or down the arm but did have one episode of nonbloody vomiting.  He 

states that this morning when he received his morphine and improved his overall 

symptoms.  Otherwise he has no complaints at this time.





He was not answering any questions yesterday and just kept moaning about 

wanting TUMs. However, today he would answer questions and believed he was here 

for bronchitis and that all his problems were related to bronchitis. We had a 

discussion about how weak his heart is and that he will die if he doesn't take 

this seriously. He is feeling much better today with his breathing and his 

pedal edema is better. We even discussed hospice as I told him that if he did 

not take his CHF meds and anticoagulants that he would probably not live a year 

and be very weak and SOB. He wanted to speak to his 2 estranged brothers. When 

asked if he would take his meds, he stated he was homeless and even if the meds 

were inexpensive he didn't feel he could take them. Per pt, disability was 

applied for the last time he was in the hospital. Hopefully that will be 

available soon.





Review of Systems


Constitutional:  COMPLAINS OF: Weight loss


Other


ROS Limitations:  Clinical Condition


Constitutional:  DENIES: Fever, Chills


Endocrine:  DENIES: Polyphagia


Eyes:  DENIES: Blurred vision, Double Vision


Ears, nose, mouth, throat:  DENIES: Throat pain, Running Nose


Respiratory:  COMPLAINS OF: Cough, Shortness of breath, DENIES: Sputum 

production


Cardiovascular:  COMPLAINS OF: Chest pain, Dyspnea on Exertion


Gastrointestinal:  COMPLAINS OF: Vomiting, DENIES: Abdominal pain, Constipation

, Diarrhea, Nausea


Genitourinary:  DENIES: Hematuria, Dysuria


Musculoskeletal:  COMPLAINS OF: Joint pain, Muscle aches


Integumentary:  DENIES: Rash


Hematologic/lymphatic:  DENIES: Lymphadenopathy


Immunologic/allergic:  DENIES: Urticaria


Neurologic:  DENIES: Headache


Psychiatric:  DENIES: Mood changes, Depression





Past Family Social History


Past Medical History


Reported:


Depression


ADHD


Restless leg syndrome





Per chart review:


Alcohol abuse


Prior suicide attempt


CHF


Apical cardiac thrombus


Atrial fibrillation


Schizophrenia, not on medications


GI ulceration


Past Surgical History


Per report: L elbow fixation





Per chart review: Chest tube placement


Allergies:  


Coded Allergies:  


     No Known Allergies (Verified  Allergy, Unknown, 18)


Family History


Mother - unknown malignancy, 


Father - CAD, 


Siblings - one brother  from CAD, alcoholism


Social History


Patient is currently homeless and without financial assistance for medications.


Alcohol - history of abuse, drinks 1 time a week up to 1L per time, otherwise 

drinks 1/2 per day, Last drink 2 weeks. also stated he had had no alcohol x 3 

weeks


Tobacco - 1/2ppd for 36 years


Illicit -Marijuana use, otherwise no history reported





Physical Exam


Vital Signs





Vital Signs








  Date Time  Temp Pulse Resp B/P (MAP) Pulse Ox O2 Delivery O2 Flow Rate FiO2


 


18 11:50 97.1 90 20 120/96 (104) 96   


 


18 11:00     98   21


 


18 07:50 97.7 73 20 113/82 (92) 96   


 


18 04:00 98.1 86 18 124/91 (102) 96   


 


18 03:42  81      


 


18 00:00 97.3 76 16 107/83 (91) 99   


 


18 23:42  62      


 


18 20:00 98.1 80 16 114/75 (88) 98   


 


18 19:45  83      


 


18 19:25        21


 


18 16:00 97.7 87 18 147/99 (115) 95   








Physical Exam


GENERAL: Thin appearing, somewhat cachectic disheveled male lying in bed in no 

acute distress.


SKIN: Warm and dry. No rash.  Hyperpigmented.


HEENT: Atraumatic, normocephalic with extraocular motions intact.  PERRLA, 

mildly. Oropharynx clear without erythema or exudate.  No rhinorrhea.  No 

palpable LAD, JVD, or thyroid abnormality.


CARDIOVASCULAR: Regular rate and rhythm without obvious murmurs, gallops, or 

rubs. 2+ pulses in all four extremities. 


RESPIRATORY: Fine crackles at bilateral bases gone today with good aeration 

otherwise.  No increased work of breathing at this time on room air.  Able to 

converse in full complete sentences.


GASTROINTESTINAL: Abdomen soft, non-tender, nondistended with positive bowel 

sounds. No masses appreciated.


MUSCULOSKELETAL: No cyanosis or edema. No calf tenderness.  Ambulating without 

assistance.


NEURO/PSYCH: Neurological exam afocal.  AAO 3.  Speech is slowed with patient 

intermittently sleeping during exam/interview.


Laboratory





Laboratory Tests








Test


  18


21:24 18


04:09 18


10:02


 


Total Creatine Kinase 159  112  103 


 


Creatine Kinase MB 5.2  5.0  4.2 


 


Troponin I 0.25  0.60  0.49 


 


White Blood Count  5.5  


 


Red Blood Count  4.36  


 


Hemoglobin  12.2  


 


Hematocrit  38.3  


 


Mean Corpuscular Volume  87.8  


 


Mean Corpuscular Hemoglobin  28.1  


 


Mean Corpuscular Hemoglobin


Concent 


  32.0 


  


 


 


Red Cell Distribution Width  16.9  


 


Platelet Count  353  


 


Mean Platelet Volume  7.5  


 


Neutrophils (%) (Auto)  72.3  


 


Lymphocytes (%) (Auto)  17.2  


 


Monocytes (%) (Auto)  6.8  


 


Eosinophils (%) (Auto)  2.9  


 


Basophils (%) (Auto)  0.8  


 


Neutrophils # (Auto)  4.0  


 


Lymphocytes # (Auto)  0.9  


 


Monocytes # (Auto)  0.4  


 


Eosinophils # (Auto)  0.2  


 


Basophils # (Auto)  0.0  


 


CBC Comment  DIFF FINAL  


 


Differential Comment    


 


Prothrombin Time  10.6  


 


Prothromb Time International


Ratio 


  1.0 


  


 


 


Blood Urea Nitrogen  15  


 


Creatinine  0.84  


 


Random Glucose  103  


 


Total Protein  5.5  


 


Albumin  2.3  


 


Calcium Level  8.2  


 


Magnesium Level  1.9  


 


Alkaline Phosphatase  104  


 


Aspartate Amino Transf


(AST/SGOT) 


  20 


  


 


 


Alanine Aminotransferase


(ALT/SGPT) 


  21 


  


 


 


Total Bilirubin  0.1  


 


Sodium Level  144  


 


Potassium Level  3.9  


 


Chloride Level  109  


 


Carbon Dioxide Level  28.1  


 


Anion Gap  7  


 


Estimat Glomerular Filtration


Rate 


  97 


  


 








Result Diagram:  


18 0409                                                                   

             18 0409





Imaging





Last 72 hours Impressions








Chest X-Ray 18 0725 Signed





Impressions: 





 CONCLUSION: 





 Mild interstitial changes in the lower lung zones in a pattern suggesting inter





 stitial pulmonary edema. Given the enlarged cardiac silhouette a cardiogenic ca





 use is likely.





  





 


 


Head CT 18 0000 Signed





Impressions: 





 CONCLUSION:





 No acute intracranial abnormality is identified.





  





 











Caprini VTE Risk Assessment


Caprini VTE Risk Assessment:  Mod/High Risk (score >= 2)


Caprini Risk Assessment Model











 Point Value = 1          Point Value = 2  Point Value = 3  Point Value = 5


 


Age 41-60


Minor surgery


BMI > 25 kg/m2


Swollen legs


Varicose veins


Pregnancy or postpartum


History of unexplained or recurrent


   spontaneous 


Oral contraceptives or hormone


   replacement


Sepsis (< 1 month)


Serious lung disease, including


   pneumonia (< 1 month)


Abnormal pulmonary function


Acute myocardial infarction


Congestive heart failure (< 1 month)


History of inflammatory bowel disease


Medical patient at bed rest Age 61-74


Arthroscopic surgery


Major open surgery (> 45 min)


Laparoscopic surgery (> 45 min)


Malignancy


Confined to bed (> 72 hours)


Immobilizing plaster cast


Central venous access Age >= 75


History of VTE


Family history of VTE


Factor V Leiden


Prothrombin [id number]


Lupus anticoagulant


Anticardiolipin antibodies


Elevated serum homocysteine


Heparin-induced thrombocytopenia


Other congenital or acquired


   thrombophilia Stroke (< 1 month)


Elective arthroplasty


Hip, pelvis, or leg fracture


Acute spinal cord injury (< 1 month)








Prophylaxis Regimen











   Total Risk


Factor Score Risk Level Prophylaxis Regimen


 


0-1      Low Early ambulation


 


2 Moderate Order ONE of the following:


*Sequential Compression Device (SCD)


*Heparin 5000 units SQ BID


 


3-4 Higher Order ONE of the following medications:


*Heparin 5000 units SQ TID


*Enoxaparin/Lovenox 40 mg SQ daily (WT < 150 kg, CrCl > 30 mL/min)


*Enoxaparin/Lovenox 30 mg SQ daily (WT < 150 kg, CrCl > 10-29 mL/min)


*Enoxaparin/Lovenox 30 mg SQ BID (WT < 150 kg, CrCl > 30 mL/min)


AND/OR


*Sequential Compression Device (SCD)


 


5 or more Highest Order ONE of the following medications:


*Heparin 5000 units SQ TID (Preferred with Epidurals)


*Enoxaparin/Lovenox 40 mg SQ daily (WT < 150 kg, CrCl > 30 mL/min)


*Enoxaparin/Lovenox 30 mg SQ daily (WT < 150 kg, CrCl > 10-29 mL/min)


*Enoxaparin/Lovenox 30 mg SQ BID (WT < 150 kg, CrCl > 30 mL/min)


AND


*Sequential Compression Device (SCD)











Assessment and Plan


Assessment and Plan


Mr. Christensen is a 52-year-old male admitted for acute on chronic CHF exacerbation.


Problem List:  


(1) Acute on chronic congestive heart failure


ICD Codes:  I50.9 - Heart failure, unspecified


Status:  Acute


Plan:  Patient with history of CHF presenting with acute exacerbation.





-Chest x-ray: Mild interstitial changes and lower lung zones suggesting 

interstitial pulmonary edema.  Enlarged cardiac silhouette.


-EKG: Sinus rhythm with PVCs.  Inverted T waves of lead 5, V6, 1, and aVL.  No 

ST elevation/depression.  (Per medical team read)


-BNP: 2128


-CK-MB: 7.5


-Troponin: 0.08


-CMP: Electrolytes within normal limits





-Repeat echocardiogram ordered


-Trend EKG, CK, troponin 3


-Consulted cardiology, appreciate recommendations





Medications:


-Morphine 4 mg, aspirin 162 mg, and Lasix 40 mg IV given once in ED


-Continue home carvedilol, enalapril, aspirin, and Lasix 


-Potassium 20 mEq daily





his troponins were elevated with a high of 0.6 then diminished. per his records 

he is listed as nonischemic CHF. he is a very poor historian (listed as 

schizophrenic in his chart). However, he did not have a nuclear or other stress 

test. If he has sxs of ischemia then Cardiology can consider if further workup 

is needed. 


ordered CHF booklet for him. explained that his problems with his feet swelling 

and SOB were from his heart. he needs continued education on CHF





(2) Thrombus in heart chamber


ICD Codes:  I51.3 - Intracardiac thrombosis, not elsewhere classified


Status:  Acute


Plan:  Patient with recent hospitalization showing apical cardiac thrombus on 

echocardiogram.





-Echo 18: Moderately dilated left ventricle with normal wall thickness.  

EF of 10-15%.  Basal posterior and basal lateral walls are mildly hypokinetic 

in all other segments are severely hypokinetic.  A 1.6 x 1.9 cm ovoid 

echodensity at the apex consistent with apical thrombus.


-Due to patient's history of alcohol abuse with increased risk of falls, 

patient is at moderate risk for bleeding with HAS-BLED score of 2.  Once mental 

status improved, medical team to have discussions on chronic anticoagulation.





-Repeat echocardiogram ordered





Medications:


-Anticoagulation with Lovenox twice daily (1 mg per Kg)


he can kill himself with coumadin as he has no plans to get labs. hopefully he 

can get disability and take a novel agent





(3) Atrial fibrillation


ICD Codes:  I48.91 - Unspecified atrial fibrillation


Status:  Chronic


Plan:  Patient with reported history of atrial fibrillation





-EKG: Pending





Medications:


-Continue home Coreg


-Patient anticoagulated on Lovenox at this time





(4) Altered mental status


ICD Codes:  R41.82 - Altered mental status


Status:  Acute


Plan:  Patient with altered mental status on exam secondary to alcoholism 

versus electrolyte abnormality versus fatigue.  Patient also with history of 

mood disorder per chart review.





-CIWA protocol in place


-Swallow study done


-Continue to monitor, low threshold for psychiatric evaluation





he was able to answer questions today and has some capacity to make decisions 

for himself





(5) Alcoholism


ICD Codes:  F10.20 - Alcoholism


Status:  Chronic


Plan:  Patient with reported history of alcoholism with last drink per report 2 

weeks ago


he is elusive as to his drinking habits and last drink


he was an opiate abuser in the past but quit





-Ethanol: Less than 3





Medications:


-CIWA protocol in place


-Daily multivitamin, folic acid, and thiamine





(6) Nausea


ICD Codes:  R11.0 - Nausea


Status:  Acute


Plan:  Patient reporting nausea with one episode of vomiting approximately 2 

days ago that was nonbloody.





Medications:


-Zofran as needed for nausea/vomiting


-Reglan given once in ED





(7) Spasm


ICD Codes:  R25.2 - Cramp and spasm


Status:  Chronic


Plan:  Patient with reported history of chronic muscle spasms





-CMP: Electrolytes within normal limits





Medications:


-Continue home clonazepam 0.5 mg twice daily as needed for spasm





(8) Nutrition, metabolism, and development symptoms


ICD Codes:  R63.8 - Symptoms concerning nutrition, metabolism, and development


Status:  Acute


Plan:  -Diet: N.p.o. until swallow evaluation, otherwise advance as tolerated


-Fluids: Patient appears hydrated, continue to monitor with diuresis


-Electrodes: Within normal limits, continue to monitor


-Prophylaxis: DuoNeb's as needed for shortness of breath/wheezing, clonidine as 

needed for blood pressure greater than 180/110, Zofran as needed for nausea/

vomiting, CIWA protocol in place, famotidine twice daily as needed for reflux/ 

added TUMS





(9) DVT prophylaxis


Status:  Acute


Plan:  -SCDs


-Medications: Lovenox twice daily for cardiac thrombus








Problem Qualifiers





(1) Acute on chronic congestive heart failure:  


Qualified Codes:  I50.23 - Acute on chronic systolic (congestive) heart failure


(2) Atrial fibrillation:  


Qualified Codes:  I48.91 - Unspecified atrial fibrillation


(3) Altered mental status:  


Qualified Codes:  R41.82 - Altered mental status, unspecified








Viv Shane MD 2018 15:42

## 2018-06-17 NOTE — EKG
Date Performed: 06/16/2018       Time Performed: 08:31:44

 

PTAGE:      52 years

 

EKG:      Sinus rhythm 

 

 WITH OCCASIONAL SUPRAVENTRICULAR PREMATURE COMPLEXES POSSIBLE LEFT ATRIAL ENLARGEMENT POSSIBLE LEFT 
VENTRICULAR HYPERTROPHY MODERATE T-WAVE ABNORMALITY, CONSIDER LATERAL ISCHEMIA ABNORMAL ECG

 

PREVIOUS TRACING       : 05/24/2018 10.15 Since the previous tracing, no significant change noted

 

DOCTOR:   Corey Son  Interpretating Date/Time  06/17/2018 13:46:45

## 2018-06-18 VITALS
OXYGEN SATURATION: 98 % | DIASTOLIC BLOOD PRESSURE: 82 MMHG | HEART RATE: 87 BPM | RESPIRATION RATE: 18 BRPM | SYSTOLIC BLOOD PRESSURE: 118 MMHG | TEMPERATURE: 98.6 F

## 2018-06-18 VITALS
RESPIRATION RATE: 19 BRPM | OXYGEN SATURATION: 100 % | HEART RATE: 93 BPM | DIASTOLIC BLOOD PRESSURE: 100 MMHG | SYSTOLIC BLOOD PRESSURE: 126 MMHG | TEMPERATURE: 98.5 F

## 2018-06-18 VITALS
DIASTOLIC BLOOD PRESSURE: 80 MMHG | RESPIRATION RATE: 16 BRPM | SYSTOLIC BLOOD PRESSURE: 104 MMHG | HEART RATE: 86 BPM | OXYGEN SATURATION: 98 % | TEMPERATURE: 98.2 F

## 2018-06-18 VITALS
SYSTOLIC BLOOD PRESSURE: 120 MMHG | TEMPERATURE: 98.7 F | DIASTOLIC BLOOD PRESSURE: 78 MMHG | RESPIRATION RATE: 18 BRPM | HEART RATE: 86 BPM | OXYGEN SATURATION: 100 %

## 2018-06-18 VITALS
HEART RATE: 90 BPM | OXYGEN SATURATION: 98 % | RESPIRATION RATE: 18 BRPM | TEMPERATURE: 98.3 F | SYSTOLIC BLOOD PRESSURE: 111 MMHG | DIASTOLIC BLOOD PRESSURE: 73 MMHG

## 2018-06-18 VITALS — HEART RATE: 87 BPM

## 2018-06-18 VITALS
DIASTOLIC BLOOD PRESSURE: 77 MMHG | OXYGEN SATURATION: 97 % | RESPIRATION RATE: 16 BRPM | TEMPERATURE: 98.3 F | HEART RATE: 87 BPM | SYSTOLIC BLOOD PRESSURE: 100 MMHG

## 2018-06-18 VITALS — HEART RATE: 82 BPM

## 2018-06-18 VITALS — OXYGEN SATURATION: 97 %

## 2018-06-18 LAB
BUN SERPL-MCNC: 15 MG/DL (ref 7–18)
CALCIUM SERPL-MCNC: 8.1 MG/DL (ref 8.5–10.1)
CHLORIDE SERPL-SCNC: 106 MEQ/L (ref 98–107)
CREAT SERPL-MCNC: 0.86 MG/DL (ref 0.6–1.3)
ERYTHROCYTE [DISTWIDTH] IN BLOOD BY AUTOMATED COUNT: 16.5 % (ref 11.6–17.2)
GFR SERPLBLD BASED ON 1.73 SQ M-ARVRAT: 94 ML/MIN (ref 89–?)
GLUCOSE SERPL-MCNC: 82 MG/DL (ref 74–106)
HCO3 BLD-SCNC: 25.9 MEQ/L (ref 21–32)
HCT VFR BLD CALC: 36.9 % (ref 39–51)
HGB BLD-MCNC: 11.8 GM/DL (ref 13–17)
MCH RBC QN AUTO: 28.4 PG (ref 27–34)
MCHC RBC AUTO-ENTMCNC: 32.1 % (ref 32–36)
MCV RBC AUTO: 88.7 FL (ref 80–100)
PLATELET # BLD: 291 TH/MM3 (ref 150–450)
PMV BLD AUTO: 8.6 FL (ref 7–11)
RBC # BLD AUTO: 4.16 MIL/MM3 (ref 4.5–5.9)
SODIUM SERPL-SCNC: 139 MEQ/L (ref 136–145)
WBC # BLD AUTO: 5.6 TH/MM3 (ref 4–11)

## 2018-06-18 RX ADMIN — ACETAMINOPHEN PRN MG: 325 TABLET ORAL at 08:48

## 2018-06-18 RX ADMIN — FAMOTIDINE PRN MG: 20 TABLET, FILM COATED ORAL at 21:16

## 2018-06-18 RX ADMIN — ENALAPRIL MALEATE SCH MG: 2.5 TABLET ORAL at 08:49

## 2018-06-18 RX ADMIN — ENOXAPARIN SODIUM SCH MG: 60 INJECTION SUBCUTANEOUS at 21:16

## 2018-06-18 RX ADMIN — POTASSIUM CHLORIDE SCH MEQ: 20 TABLET, EXTENDED RELEASE ORAL at 19:51

## 2018-06-18 RX ADMIN — NICOTINE SCH PATCH: 14 PATCH, EXTENDED RELEASE TOPICAL at 08:50

## 2018-06-18 RX ADMIN — FOLIC ACID SCH MG: 1 TABLET ORAL at 08:49

## 2018-06-18 RX ADMIN — Medication SCH ML: at 08:50

## 2018-06-18 RX ADMIN — ASPIRIN 81 MG SCH MG: 81 TABLET ORAL at 08:49

## 2018-06-18 RX ADMIN — POTASSIUM CHLORIDE SCH MEQ: 20 TABLET, EXTENDED RELEASE ORAL at 08:49

## 2018-06-18 RX ADMIN — ALUMINUM HYDROXIDE, MAGNESIUM HYDROXIDE, AND SIMETHICONE PRN ML: 200; 200; 20 SUSPENSION ORAL at 21:16

## 2018-06-18 RX ADMIN — CARVEDILOL SCH MG: 3.12 TABLET, FILM COATED ORAL at 08:49

## 2018-06-18 RX ADMIN — MULTIPLE VITAMINS W/ MINERALS TAB SCH TAB: TAB at 08:49

## 2018-06-18 RX ADMIN — FUROSEMIDE SCH MG: 20 TABLET ORAL at 08:49

## 2018-06-18 RX ADMIN — Medication SCH MG: at 08:49

## 2018-06-18 RX ADMIN — ACETAMINOPHEN PRN MG: 325 TABLET ORAL at 18:17

## 2018-06-18 RX ADMIN — Medication SCH ML: at 19:51

## 2018-06-18 RX ADMIN — ENOXAPARIN SODIUM SCH MG: 60 INJECTION SUBCUTANEOUS at 09:38

## 2018-06-18 RX ADMIN — CARVEDILOL SCH MG: 3.12 TABLET, FILM COATED ORAL at 19:51

## 2018-06-18 NOTE — HHI.FPPN
Subjective


Remarks


Patient seen and examined this morning.  No events overnight per nursing 

report.  Patient continues to be drowsy with slow speech during exam which 

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 

encephalopathy.  He states that currently his only complaint is continued back 

pain.  He reports that this pain is chronic, but "currently is not that bad."  

He has no other current complaints and denies any fevers, chills, shortness of 

breath, chest pain, NVD, abdominal pain, or calf tenderness.


 (Fei Hastings MD R2)





Objective


Vitals





Vital Signs








  Date Time  Temp Pulse Resp B/P (MAP) Pulse Ox O2 Delivery O2 Flow Rate FiO2


 


6/18/18 08:04 98.5 93 19 126/100 (109) 100   


 


6/18/18 08:00     97   


 


6/18/18 04:00 98.2 86 16 104/80 (88) 98   


 


6/18/18 00:00 98.3 87 16 100/77 (85) 97   


 


6/17/18 23:40  84      


 


6/17/18 20:00 98.8 90 18 110/74 (86) 98   


 


6/17/18 16:00  96      


 


6/17/18 15:00 97.1 89 20 130/99 (109) 96   


 


6/17/18 12:00  102      


 


6/17/18 11:50 97.1 90 20 120/96 (104) 96   


 


6/17/18 11:00     98   21














I/O      


 


 6/17/18 6/17/18 6/17/18 6/18/18 6/18/18 6/18/18





 07:00 15:00 23:00 07:00 15:00 23:00


 


Intake Total 460 ml  821 ml 240 ml  


 


Output Total 1500 ml     


 


Balance -1040 ml  821 ml 240 ml  


 


      


 


Intake Oral 460 ml  821 ml 240 ml  


 


Output Urine Total 1500 ml     


 


# Voids   7 2  


 


# Bowel Movements    0  








 (Fei Hastings MD R2)


Result Diagram:  


6/18/18 0550                                                                   

             6/18/18 0550





Objective Remarks


GENERAL: Thin appearing male lying in bed in no acute distress.


SKIN: Warm and dry. No rash.  Hyperpigmented.


HEENT: Atraumatic, normocephalic with extraocular motions intact. No 

rhinorrhea. No visible lymphadenopathy or jugulovenous distension appreciated. 


CARDIOVASCULAR: Regular rate and rhythm without obvious murmurs, gallops, or 

rubs. 2+ pulses in all four extremities. 


RESPIRATORY: Clear to auscultation bilaterally with no crackles, wheezes, or 

rhonchi. No increased work of breathing.


GASTROINTESTINAL: Abdomen soft, non-tender, nondistended with positive bowel 

sounds. No masses appreciated.


MUSCULOSKELETAL: No cyanosis or edema. No calf tenderness. 


NEURO/PSYCH: Afocal. Awake, alert, and oriented x3.  Slowed speech with normal 

judgment.  Patient drowsy, but not lethargic which appears to be his baseline.


 (Fei Hastings MD R2)





A/P


Assessment and Plan


Mr. Christensen is a 52-year-old male admitted for acute on chronic CHF exacerbation.


Discharge Planning


Pending echocardiogram


Pending cardiology clearance, medical team to contact cardiology service as 

troponin had increased since initial evaluation


 (Fei Hastings MD R2)


Problem List:  


(1) Acute on chronic congestive heart failure


ICD Codes:  I50.9 - Heart failure, unspecified


Status:  Acute


Plan:  Patient with history of CHF presenting with acute exacerbation.





-Chest x-ray: Mild interstitial changes and lower lung zones suggesting 

interstitial pulmonary edema.  Enlarged cardiac silhouette.


-EKG: Sinus rhythm with PVCs.  Inverted T waves of lead 5, V6, 1, and aVL.  No 

ST elevation/depression.  (Per medical team read)


-BNP: 2128


-CK-MB: WNL


-Troponin: 0.08, 0.07, 0.25, 0.60, 0.49


-CMP: Electrolytes within normal limits





-Echocardiogram: Pending


-Consulted cardiology, appreciate recommendations.


-CHF education ordered.





Medications:


-Morphine 4 mg, aspirin 162 mg, and Lasix 40 mg IV given once in ED


-Continue home carvedilol, enalapril, aspirin, and Lasix 


-Potassium 20 mEq daily





(2) Thrombus in heart chamber


ICD Codes:  I51.3 - Intracardiac thrombosis, not elsewhere classified


Status:  Acute


Plan:  Patient with recent hospitalization showing apical cardiac thrombus on 

echocardiogram.





-Echo 5/26/18: Moderately dilated left ventricle with normal wall thickness.  

EF of 10-15%.  Basal posterior and basal lateral walls are mildly hypokinetic 

in all other segments are severely hypokinetic.  A 1.6 x 1.9 cm ovoid 

echodensity at the apex consistent with apical thrombus.


-Due to patient's history of alcohol abuse with increased risk of falls, 

patient is at moderate risk for bleeding with HAS-BLED score of 2.  Once mental 

status improved, medical team to have discussions on chronic anticoagulation.





-Repeat echocardiogram ordered





-Patient thoroughly educated on seriousness of diagnosis.  Patient educated on 

possible outcomes including possible stroke or death.


-Patient states that he will be compliant with medications upon discharge, 

however due to finances he is unsure if he is able to continue all medications.

  Medical team to discuss possible discharge planning with case management.





Medications:


-Anticoagulation with Lovenox twice daily (1 mg per Kg)





(3) Atrial fibrillation


ICD Codes:  I48.91 - Unspecified atrial fibrillation


Status:  Chronic


Plan:  Patient with reported history of atrial fibrillation





Medications:


-Continue home Coreg


-Patient anticoagulated on Lovenox at this time





(4) Altered mental status


ICD Codes:  R41.82 - Altered mental status


Status:  Acute


Plan:  Patient with altered mental status on exam secondary to alcoholism 

versus electrolyte abnormality versus fatigue.  Patient also with history of 

mood disorder per chart review.





-CIWA protocol in place


-Continue to monitor, low threshold for psychiatric evaluation





(5) Alcoholism


ICD Codes:  F10.20 - Alcoholism


Status:  Chronic


Plan:  Patient with reported history of alcoholism with last drink per report 2 

weeks ago


he is elusive as to his drinking habits and last drink


he was an opiate abuser in the past but quit





-Ethanol: Less than 3





Medications:


-CIWA protocol in place


-Daily multivitamin, folic acid, and thiamine





(6) Nausea


ICD Codes:  R11.0 - Nausea


Status:  Acute


Plan:  Patient reporting nausea with one episode of vomiting approximately 2 

days ago that was nonbloody.





Medications:


-Zofran as needed for nausea/vomiting


-Reglan given once in ED





(7) Spasm


ICD Codes:  R25.2 - Cramp and spasm


Status:  Chronic


Plan:  Patient with reported history of chronic muscle spasms





-CMP: Electrolytes within normal limits





Medications:


-Continue home clonazepam 0.5 mg twice daily as needed for spasm


-Tylenol as needed for pain





(8) Nutrition, metabolism, and development symptoms


ICD Codes:  R63.8 - Symptoms concerning nutrition, metabolism, and development


Status:  Acute


Plan:  -Diet: N.p.o. until swallow evaluation, otherwise advance as tolerated


-Fluids: Patient appears hydrated, continue to monitor with diuresis


-Electrodes: Within normal limits, continue to monitor


-Prophylaxis: DuoNeb's as needed for shortness of breath/wheezing, clonidine as 

needed for blood pressure greater than 180/110, Zofran as needed for nausea/

vomiting, CIWA protocol in place, famotidine twice daily as needed for reflux/ 

added TUMS, nicotine patch daily





(9) DVT prophylaxis


Status:  Acute


Plan:  -SCDs


-Medications: Lovenox twice daily for cardiac thrombus


 (Fei Hastings MD R2)


Problem List:  


(1) Acute on chronic congestive heart failure


ICD Codes:  I50.9 - Heart failure, unspecified


Status:  Acute


Plan:  Patient with history of CHF presenting with acute exacerbation.





-Chest x-ray: Mild interstitial changes and lower lung zones suggesting 

interstitial pulmonary edema.  Enlarged cardiac silhouette.


-EKG: Sinus rhythm with PVCs.  Inverted T waves of lead 5, V6, 1, and aVL.  No 

ST elevation/depression.  (Per medical team read)


-BNP: 2128


-CK-MB: WNL


-Troponin: 0.08, 0.07, 0.25, 0.60, 0.49


-CMP: Electrolytes within normal limits





-Echocardiogram: Pending


-Consulted cardiology, appreciate recommendations.


-CHF education ordered.





Medications:


-Morphine 4 mg, aspirin 162 mg, and Lasix 40 mg IV given once in ED


-Continue home carvedilol, enalapril, aspirin, and Lasix 


-Potassium 20 mEq daily





(2) Thrombus in heart chamber


ICD Codes:  I51.3 - Intracardiac thrombosis, not elsewhere classified


Status:  Acute


Plan:  Patient with recent hospitalization showing apical cardiac thrombus on 

echocardiogram.





-Echo 5/26/18: Moderately dilated left ventricle with normal wall thickness.  

EF of 10-15%.  Basal posterior and basal lateral walls are mildly hypokinetic 

in all other segments are severely hypokinetic.  A 1.6 x 1.9 cm ovoid 

echodensity at the apex consistent with apical thrombus.


-Due to patient's history of alcohol abuse with increased risk of falls, 

patient is at moderate risk for bleeding with HAS-BLED score of 2.  Once mental 

status improved, medical team to have discussions on chronic anticoagulation.





-Repeat echocardiogram ordered





-Patient thoroughly educated on seriousness of diagnosis.  Patient educated on 

possible outcomes including possible stroke or death.


-Patient states that he will be compliant with medications upon discharge, 

however due to finances he is unsure if he is able to continue all medications.

  Medical team to discuss possible discharge planning with case management.





Medications:


-Anticoagulation with Lovenox twice daily (1 mg per Kg)





(3) Atrial fibrillation


ICD Codes:  I48.91 - Unspecified atrial fibrillation


Status:  Chronic


Plan:  Patient with reported history of atrial fibrillation





Medications:


-Continue home Coreg


-Patient anticoagulated on Lovenox at this time





(4) Altered mental status


ICD Codes:  R41.82 - Altered mental status


Status:  Acute


Plan:  Patient with altered mental status on exam secondary to alcoholism 

versus electrolyte abnormality versus fatigue.  Patient also with history of 

mood disorder per chart review.





-CIWA protocol in place


-Continue to monitor, low threshold for psychiatric evaluation





(5) Alcoholism


ICD Codes:  F10.20 - Alcoholism


Status:  Chronic


Plan:  Patient with reported history of alcoholism with last drink per report 2 

weeks ago


he is elusive as to his drinking habits and last drink


he was an opiate abuser in the past but quit





-Ethanol: Less than 3





Medications:


-CIWA protocol in place


-Daily multivitamin, folic acid, and thiamine





(6) Nausea


ICD Codes:  R11.0 - Nausea


Status:  Acute


Plan:  Patient reporting nausea with one episode of vomiting approximately 2 

days ago that was nonbloody.





Medications:


-Zofran as needed for nausea/vomiting


-Reglan given once in ED





(7) Spasm


ICD Codes:  R25.2 - Cramp and spasm


Status:  Chronic


Plan:  Patient with reported history of chronic muscle spasms





-CMP: Electrolytes within normal limits





Medications:


-Continue home clonazepam 0.5 mg twice daily as needed for spasm


-Tylenol as needed for pain





(8) Nutrition, metabolism, and development symptoms


ICD Codes:  R63.8 - Symptoms concerning nutrition, metabolism, and development


Status:  Acute


Plan:  -Diet: N.p.o. until swallow evaluation, otherwise advance as tolerated


-Fluids: Patient appears hydrated, continue to monitor with diuresis


-Electrodes: Within normal limits, continue to monitor


-Prophylaxis: DuoNeb's as needed for shortness of breath/wheezing, clonidine as 

needed for blood pressure greater than 180/110, Zofran as needed for nausea/

vomiting, CIWA protocol in place, famotidine twice daily as needed for reflux/ 

added TUMS, nicotine patch daily





(9) DVT prophylaxis


Status:  Acute


Plan:  -SCDs


-Medications: Lovenox twice daily for cardiac thrombus











See the residents documentation for details. I saw and evaluated the patient 

regarding the key portions of this evaluation and agree with the residents 

findings and plans as written.


Parts of this note were created using dragon voice recognition software 

program. While efforts were made to correct any mistakes made by this software, 

some mistakes, errors, and omissions may remain in the final note that were not 

caught when the note was originally created. Plan of care was discussed and 

agreed upon with the patient as specifically documented in the above note. An 

opportunity to ask questions with explanation was provided. Patient voiced 

understanding on all information reviewed and discussed.


 (Sumeet Eugene MD)





Problem Qualifiers





(1) Acute on chronic congestive heart failure:  


Qualified Codes:  I50.23 - Acute on chronic systolic (congestive) heart failure


(2) Atrial fibrillation:  


Qualified Codes:  I48.91 - Unspecified atrial fibrillation


(3) Altered mental status:  


Qualified Codes:  R41.82 - Altered mental status, unspecified








Fei Hastings MD R2 Jun 18, 2018 10:49


Sumeet Eugene MD Jun 21, 2018 10:38

## 2018-06-19 VITALS — HEART RATE: 83 BPM

## 2018-06-19 VITALS
OXYGEN SATURATION: 97 % | HEART RATE: 86 BPM | DIASTOLIC BLOOD PRESSURE: 90 MMHG | SYSTOLIC BLOOD PRESSURE: 119 MMHG | RESPIRATION RATE: 18 BRPM | TEMPERATURE: 97.5 F

## 2018-06-19 VITALS
OXYGEN SATURATION: 97 % | SYSTOLIC BLOOD PRESSURE: 99 MMHG | HEART RATE: 86 BPM | DIASTOLIC BLOOD PRESSURE: 74 MMHG | RESPIRATION RATE: 17 BRPM | TEMPERATURE: 98 F

## 2018-06-19 VITALS
DIASTOLIC BLOOD PRESSURE: 90 MMHG | RESPIRATION RATE: 16 BRPM | SYSTOLIC BLOOD PRESSURE: 121 MMHG | HEART RATE: 82 BPM | OXYGEN SATURATION: 94 % | TEMPERATURE: 98.5 F

## 2018-06-19 VITALS
TEMPERATURE: 97.6 F | DIASTOLIC BLOOD PRESSURE: 88 MMHG | SYSTOLIC BLOOD PRESSURE: 120 MMHG | HEART RATE: 84 BPM | OXYGEN SATURATION: 98 % | RESPIRATION RATE: 18 BRPM

## 2018-06-19 VITALS
DIASTOLIC BLOOD PRESSURE: 86 MMHG | TEMPERATURE: 97.9 F | OXYGEN SATURATION: 98 % | RESPIRATION RATE: 17 BRPM | HEART RATE: 80 BPM | SYSTOLIC BLOOD PRESSURE: 118 MMHG

## 2018-06-19 VITALS
TEMPERATURE: 97.2 F | DIASTOLIC BLOOD PRESSURE: 84 MMHG | SYSTOLIC BLOOD PRESSURE: 122 MMHG | OXYGEN SATURATION: 97 % | HEART RATE: 82 BPM | RESPIRATION RATE: 18 BRPM

## 2018-06-19 RX ADMIN — ONDANSETRON PRN MG: 4 TABLET, ORALLY DISINTEGRATING ORAL at 00:11

## 2018-06-19 RX ADMIN — MULTIPLE VITAMINS W/ MINERALS TAB SCH TAB: TAB at 10:01

## 2018-06-19 RX ADMIN — POTASSIUM CHLORIDE SCH MEQ: 20 TABLET, EXTENDED RELEASE ORAL at 10:01

## 2018-06-19 RX ADMIN — NICOTINE SCH PATCH: 14 PATCH, EXTENDED RELEASE TOPICAL at 10:02

## 2018-06-19 RX ADMIN — FOLIC ACID SCH MG: 1 TABLET ORAL at 10:01

## 2018-06-19 RX ADMIN — Medication SCH ML: at 20:29

## 2018-06-19 RX ADMIN — ENOXAPARIN SODIUM SCH MG: 60 INJECTION SUBCUTANEOUS at 10:04

## 2018-06-19 RX ADMIN — FUROSEMIDE SCH MG: 20 TABLET ORAL at 10:01

## 2018-06-19 RX ADMIN — POTASSIUM CHLORIDE SCH MEQ: 20 TABLET, EXTENDED RELEASE ORAL at 20:29

## 2018-06-19 RX ADMIN — CARVEDILOL SCH MG: 3.12 TABLET, FILM COATED ORAL at 10:01

## 2018-06-19 RX ADMIN — Medication SCH ML: at 08:09

## 2018-06-19 RX ADMIN — ASPIRIN 81 MG SCH MG: 81 TABLET ORAL at 10:01

## 2018-06-19 RX ADMIN — FAMOTIDINE PRN MG: 20 TABLET, FILM COATED ORAL at 15:11

## 2018-06-19 RX ADMIN — Medication SCH MG: at 10:02

## 2018-06-19 RX ADMIN — ENOXAPARIN SODIUM SCH MG: 60 INJECTION SUBCUTANEOUS at 20:30

## 2018-06-19 RX ADMIN — ACETAMINOPHEN PRN MG: 325 TABLET ORAL at 00:11

## 2018-06-19 RX ADMIN — ALUMINUM HYDROXIDE, MAGNESIUM HYDROXIDE, AND SIMETHICONE PRN ML: 200; 200; 20 SUSPENSION ORAL at 15:11

## 2018-06-19 RX ADMIN — ENALAPRIL MALEATE SCH MG: 2.5 TABLET ORAL at 10:01

## 2018-06-19 RX ADMIN — ALUMINUM HYDROXIDE, MAGNESIUM HYDROXIDE, AND SIMETHICONE PRN ML: 200; 200; 20 SUSPENSION ORAL at 20:29

## 2018-06-19 RX ADMIN — CARVEDILOL SCH MG: 3.12 TABLET, FILM COATED ORAL at 20:29

## 2018-06-19 NOTE — HHI.DCPOC
Discharge Care Plan


Diagnosis:  


(1) Acute on chronic congestive heart failure


Goals to Promote Your Health


* To prevent worsening of your condition and complications


* To maintain your health at the optimal level


Directions to Meet Your Goals


*** Take your medications as prescribed


*** Follow your dietary instruction


*** Follow activity as directed








*** Keep your appointments as scheduled


*** Take your immunizations and boosters as scheduled


*** If your symptoms worsen call your PCP, if no PCP go to Urgent Care Center 

or Emergency Room***


*** Smoking is Dangerous to Your Health. Avoid second hand smoke***


***Call the 24-hour hour crisis hotline for domestic abuse at 1-465.123.7086***











Fei Hastings MD R2 Jun 19, 2018 08:24

## 2018-06-19 NOTE — HHI.FPPN
Subjective


Remarks


Patient seen and examined this morning by medical team.  No acute events 

overnight per nursing staff.  Patient complaining of chronic back pain likely 

secondary to deconditioning exacerbated by hospital stay.  Medical team and 

thorough discussion with patient regarding his congestive heart failure 

diagnosis.  Minor team stressed the importance of medical compliance with 

medications he will be given at the time of discharge.  Patient was encouraged 

to follow-up with PCP as well as cardiology for further management.  Otherwise 

patient had no complaints and denied any fevers, chills, shortness of breath, 

chest pain, NVD, abdominal pain, or calf tenderness.


 (Fei Hastings MD R2)





Objective


Vitals





Vital Signs








  Date Time  Temp Pulse Resp B/P (MAP) Pulse Ox O2 Delivery O2 Flow Rate FiO2


 


6/19/18 04:04      Room Air  


 


6/19/18 04:00 97.9 80 17 118/86 (97) 98   


 


6/19/18 04:00  80      


 


6/19/18 00:00      Room Air  


 


6/19/18 00:00 98.0 86 17 99/74 (82) 97   


 


6/19/18 00:00  83      


 


6/18/18 20:29        21


 


6/18/18 20:00  90      


 


6/18/18 20:00      Room Air  


 


6/18/18 20:00 98.3 85 18 111/73 (86) 98   


 


6/18/18 16:04 98.7 86 18 120/78 (92) 100   


 


6/18/18 15:41  87      


 


6/18/18 12:04 98.6 87 18 118/82 (94) 98   














I/O      


 


 6/18/18 6/18/18 6/18/18 6/19/18 6/19/18 6/19/18





 07:00 15:00 23:00 07:00 15:00 23:00


 


Intake Total 240 ml  480 ml 480 ml  


 


Output Total   1200 ml 400 ml  


 


Balance 240 ml  -720 ml 80 ml  


 


      


 


Intake Oral 240 ml  480 ml 480 ml  


 


Output Urine Total   1200 ml 400 ml  


 


# Voids 2   2  


 


# Bowel Movements 0  1 1  








 (Fei Hastings MD R2)


Result Diagram:  


6/18/18 0550                                                                   

             6/18/18 0550





Objective Remarks


GENERAL: Thin appearing male lying in bed in no acute distress.


SKIN: Warm and dry. No rash.  Hyperpigmented.


HEENT: Atraumatic, normocephalic with extraocular motions intact. No 

rhinorrhea. No visible lymphadenopathy or jugulovenous distension appreciated. 


CARDIOVASCULAR: Regular rate and rhythm without obvious murmurs, gallops, or 

rubs. 2+ pulses in all four extremities. 


RESPIRATORY: Clear to auscultation bilaterally with no crackles, wheezes, or 

rhonchi. No increased work of breathing.


GASTROINTESTINAL: Abdomen soft, non-tender, nondistended with positive bowel 

sounds. No masses appreciated.


MUSCULOSKELETAL: No cyanosis or edema. No calf tenderness.  Ambulating well per 

report


NEURO/PSYCH: Afocal. Awake, alert, and oriented x3.  Slowed speech with normal 

judgment.  Patient drowsy, but not lethargic which appears to be his baseline.


 (Fei Hastings MD R2)





A/P


Assessment and Plan


Mr. Christensen is a 52-year-old male admitted for acute on chronic CHF exacerbation.


Discharge Planning


Pending echocardiogram and read.


Patient refusing physical therapy evaluation. 


Patient be discharged home


Patient will be given a one-month supply of cardiac medications upon discharge 

as well as information for PCP follow-up, discussed with case management


 (Fei Hastings MD R2)


Problem List:  


(1) Acute on chronic congestive heart failure


ICD Codes:  I50.9 - Heart failure, unspecified


Status:  Acute


Plan:  Patient with history of CHF presenting with acute exacerbation.





-Chest x-ray: Mild interstitial changes and lower lung zones suggesting 

interstitial pulmonary edema.  Enlarged cardiac silhouette.


-EKG: Sinus rhythm with PVCs.  Inverted T waves of lead 5, V6, 1, and aVL.  No 

ST elevation/depression.  (Per medical team read)


-BNP: 2128


-CK-MB: WNL


-Troponin: 0.08, 0.07, 0.25, 0.60, 0.49


-CMP: Electrolytes within normal limits





-Echocardiogram: Pending


-Consulted cardiology, appreciate recommendations.  Advised against chronic 

anticoagulation due to noncompliance.


-CHF education ordered.





Medications:


-Morphine 4 mg, aspirin 162 mg, and Lasix 40 mg IV given once in ED


-Continue home carvedilol, enalapril, aspirin, and Lasix 


-Potassium 20 mEq daily





(2) Thrombus in heart chamber


ICD Codes:  I51.3 - Intracardiac thrombosis, not elsewhere classified


Status:  Acute


Plan:  Patient with recent hospitalization showing apical cardiac thrombus on 

echocardiogram.





-Echo 5/26/18: Moderately dilated left ventricle with normal wall thickness.  

EF of 10-15%.  Basal posterior and basal lateral walls are mildly hypokinetic 

in all other segments are severely hypokinetic.  A 1.6 x 1.9 cm ovoid 

echodensity at the apex consistent with apical thrombus.


-Due to patient's history of alcohol abuse with increased risk of falls, 

patient is at moderate risk for bleeding with HAS-BLED score of 2.  Once mental 

status improved, medical team to have discussions on chronic anticoagulation.





-Repeat echocardiogram ordered





-Patient thoroughly educated on seriousness of diagnosis.  Patient educated on 

possible outcomes including possible stroke or death.


-Patient states that he will be compliant with medications upon discharge, 

however due to finances he is unsure if he is able to continue all medications.

  Medical team to discuss possible discharge planning with case management.





Medications:


-Anticoagulation with Lovenox twice daily (1 mg per Kg)


-Patient to be discharged home on daily aspirin as he is a poor candidate for 

chronic anticoagulation due to noncompliance, chronic alcoholism, and increased 

risk of falls/trauma





(3) Atrial fibrillation


ICD Codes:  I48.91 - Unspecified atrial fibrillation


Status:  Chronic


Plan:  Patient with reported history of atrial fibrillation





Medications:


-Continue home Coreg


-Patient anticoagulated on Lovenox at this time


-Patient to be discharged home on daily aspirin as above





(4) Altered mental status


ICD Codes:  R41.82 - Altered mental status


Status:  Acute


Plan:  Patient with altered mental status on exam secondary to alcoholism 

versus electrolyte abnormality versus fatigue.  Patient also with history of 

mood disorder per chart review.





-CIWA protocol in place


-Continue to monitor, low threshold for psychiatric evaluation





(5) Alcoholism


ICD Codes:  F10.20 - Alcoholism


Status:  Chronic


Plan:  Patient with reported history of alcoholism with last drink per report 2 

weeks ago


he is elusive as to his drinking habits and last drink


he was an opiate abuser in the past but quit





-Ethanol: Less than 3





Medications:


-CIWA protocol in place


-Daily multivitamin, folic acid, and thiamine





(6) Nausea


ICD Codes:  R11.0 - Nausea


Status:  Acute


Plan:  Patient reporting nausea with one episode of vomiting approximately 2 

days ago that was nonbloody.





Medications:


-Zofran as needed for nausea/vomiting


-Reglan given once in ED





(7) Spasm


ICD Codes:  R25.2 - Cramp and spasm


Status:  Chronic


Plan:  Patient with reported history of chronic muscle spasms





-CMP: Electrolytes within normal limits





Medications:


-Continue home clonazepam 0.5 mg twice daily as needed for spasm


-Tylenol as needed for pain





(8) Nutrition, metabolism, and development symptoms


ICD Codes:  R63.8 - Symptoms concerning nutrition, metabolism, and development


Status:  Acute


Plan:  -Diet: Regular diet as tolerated


-Fluids: Patient appears hydrated, continue to monitor with diuresis


-Electrodes: Within normal limits, continue to monitor


-Prophylaxis: DuoNeb's as needed for shortness of breath/wheezing, clonidine as 

needed for blood pressure greater than 180/110, Zofran as needed for nausea/

vomiting, CIWA protocol in place, famotidine twice daily as needed for reflux/ 

added TUMS, nicotine patch daily





(9) DVT prophylaxis


Status:  Acute


Plan:  -SCDs


-Medications: Lovenox twice daily for cardiac thrombus, discontinued on 

discharge


 (Fei Hastings MD R2)


Problem List:  


(1) Acute on chronic congestive heart failure


ICD Codes:  I50.9 - Heart failure, unspecified


Status:  Acute


Plan:  Patient with history of CHF presenting with acute exacerbation.





-Chest x-ray: Mild interstitial changes and lower lung zones suggesting 

interstitial pulmonary edema.  Enlarged cardiac silhouette.


-EKG: Sinus rhythm with PVCs.  Inverted T waves of lead 5, V6, 1, and aVL.  No 

ST elevation/depression.  (Per medical team read)


-BNP: 2128


-CK-MB: WNL


-Troponin: 0.08, 0.07, 0.25, 0.60, 0.49


-CMP: Electrolytes within normal limits





-Echocardiogram: Pending


-Consulted cardiology, appreciate recommendations.  Advised against chronic 

anticoagulation due to noncompliance.


-CHF education ordered.





Medications:


-Morphine 4 mg, aspirin 162 mg, and Lasix 40 mg IV given once in ED


-Continue home carvedilol, enalapril, aspirin, and Lasix 


-Potassium 20 mEq daily





(2) Thrombus in heart chamber


ICD Codes:  I51.3 - Intracardiac thrombosis, not elsewhere classified


Status:  Acute


Plan:  Patient with recent hospitalization showing apical cardiac thrombus on 

echocardiogram.





-Echo 5/26/18: Moderately dilated left ventricle with normal wall thickness.  

EF of 10-15%.  Basal posterior and basal lateral walls are mildly hypokinetic 

in all other segments are severely hypokinetic.  A 1.6 x 1.9 cm ovoid 

echodensity at the apex consistent with apical thrombus.


-Due to patient's history of alcohol abuse with increased risk of falls, 

patient is at moderate risk for bleeding with HAS-BLED score of 2.  Once mental 

status improved, medical team to have discussions on chronic anticoagulation.





-Repeat echocardiogram ordered





-Patient thoroughly educated on seriousness of diagnosis.  Patient educated on 

possible outcomes including possible stroke or death.


-Patient states that he will be compliant with medications upon discharge, 

however due to finances he is unsure if he is able to continue all medications.

  Medical team to discuss possible discharge planning with case management.





Medications:


-Anticoagulation with Lovenox twice daily (1 mg per Kg)


-Patient to be discharged home on daily aspirin as he is a poor candidate for 

chronic anticoagulation due to noncompliance, chronic alcoholism, and increased 

risk of falls/trauma





(3) Atrial fibrillation


ICD Codes:  I48.91 - Unspecified atrial fibrillation


Status:  Chronic


Plan:  Patient with reported history of atrial fibrillation





Medications:


-Continue home Coreg


-Patient anticoagulated on Lovenox at this time


-Patient to be discharged home on daily aspirin as above





(4) Altered mental status


ICD Codes:  R41.82 - Altered mental status


Status:  Acute


Plan:  Patient with altered mental status on exam secondary to alcoholism 

versus electrolyte abnormality versus fatigue.  Patient also with history of 

mood disorder per chart review.





-CIWA protocol in place


-Continue to monitor, low threshold for psychiatric evaluation





(5) Alcoholism


ICD Codes:  F10.20 - Alcoholism


Status:  Chronic


Plan:  Patient with reported history of alcoholism with last drink per report 2 

weeks ago


he is elusive as to his drinking habits and last drink


he was an opiate abuser in the past but quit





-Ethanol: Less than 3





Medications:


-CIWA protocol in place


-Daily multivitamin, folic acid, and thiamine





(6) Nausea


ICD Codes:  R11.0 - Nausea


Status:  Acute


Plan:  Patient reporting nausea with one episode of vomiting approximately 2 

days ago that was nonbloody.





Medications:


-Zofran as needed for nausea/vomiting


-Reglan given once in ED





(7) Spasm


ICD Codes:  R25.2 - Cramp and spasm


Status:  Chronic


Plan:  Patient with reported history of chronic muscle spasms





-CMP: Electrolytes within normal limits





Medications:


-Continue home clonazepam 0.5 mg twice daily as needed for spasm


-Tylenol as needed for pain





(8) Nutrition, metabolism, and development symptoms


ICD Codes:  R63.8 - Symptoms concerning nutrition, metabolism, and development


Status:  Acute


Plan:  -Diet: Regular diet as tolerated


-Fluids: Patient appears hydrated, continue to monitor with diuresis


-Electrodes: Within normal limits, continue to monitor


-Prophylaxis: DuoNeb's as needed for shortness of breath/wheezing, clonidine as 

needed for blood pressure greater than 180/110, Zofran as needed for nausea/

vomiting, CIWA protocol in place, famotidine twice daily as needed for reflux/ 

added TUMS, nicotine patch daily





(9) DVT prophylaxis


Status:  Acute


Plan:  -SCDs


-Medications: Lovenox twice daily for cardiac thrombus, discontinued on 

discharge








See the residents documentation for details. I saw and evaluated the patient 

regarding the key portions of this evaluation and agree with the residents 

findings and plans as written.


Parts of this note were created using dragon voice recognition software 

program. While efforts were made to correct any mistakes made by this software, 

some mistakes, errors, and omissions may remain in the final note that were not 

caught when the note was originally created. Plan of care was discussed and 

agreed upon with the patient as specifically documented in the above note. An 

opportunity to ask questions with explanation was provided. Patient voiced 

understanding on all information reviewed and discussed.


 (Sumeet Eugene MD)





Problem Qualifiers





(1) Acute on chronic congestive heart failure:  


Qualified Codes:  I50.23 - Acute on chronic systolic (congestive) heart failure


(2) Atrial fibrillation:  


Qualified Codes:  I48.91 - Unspecified atrial fibrillation


(3) Altered mental status:  


Qualified Codes:  R41.82 - Altered mental status, unspecified








Fei Hastings MD R2 Jun 19, 2018 09:19


Sumeet Eugene MD Jun 21, 2018 10:41

## 2018-06-19 NOTE — ECHRPT
Indication:   EVAL LV CLOT, EF 

 

 CONCLUSIONS 

 No evidence of LV thrombus. 

 Severely dilated left ventricle.  

 Wall thickness is normal.  

 The left ventricular systolic function is severely reduced with an estimated ejection fraction less 
than 20%.  

 There is diffuse global hypokinesis.  

 The left atrial size is mildly dilated.  

 Trace-to-mild mitral valve regurgitation.  

 Trace aortic valve regurgitation.  

 There is trace tricuspid valve regurgitation.  

 The estimated pulmonary arterial pressure is 26 mmHg.  

 

 

 BP:        /         HR:                          Rhythm: 

 

 MEASUREMENTS  (Male / Female) Normal Values       Technical Quality: 

 2D ECHO 

 LV Diastolic Diameter PLAX        6.5 cm                4.2 - 5.9 / 3.9 - 5.3 cm 

 LV Systolic Diameter PLAX         6.1 cm                 

 IVS Diastolic Thickness           0.8 cm                0.6 - 1.0 / 0.6 - 0.9 cm 

 LVPW Diastolic Thickness          0.8 cm                0.6 - 1.0 / 0.6 - 0.9 cm 

 LV Relative Wall Thickness        0.2                    

 RV Internal Dim ED PLAX           2.2 cm                 

 LA Systolic Diameter LX           3.9 cm                3.0 - 4.0 / 2.7 - 3.8 cm 

 

 M-MODE 

 Aortic Root Diameter MM           3.2 cm                 

 AV Cusp Separation MM             2.2 cm                 

 

 DOPPLER 

 Mitral E Point Velocity           45.4 cm/s              

 Mitral A Point Velocity           49.4 cm/s              

 Mitral E to A Ratio               0.9                    

 TR Peak Velocity                  231.0 cm/s             

 TR Peak Gradient                  21.3 mmHg              

 

 

 FINDINGS 

 

 LEFT VENTRICLE 

 Severely dilated left ventricle.  

 Wall thickness is normal.  

 The left ventricular systolic function is severely reduced with an estimated ejection fraction less 
than 20%.  

 There is diffuse global hypokinesis.  

 No LV thrombus. 

 

 RIGHT VENTRICLE 

 Normal right ventricular size and systolic function.   

 

 LEFT ATRIUM 

 The left atrial size is mildly dilated.  

 

 RIGHT ATRIUM 

 The right atrial size is normal.   

 

 ATRIAL SEPTUM 

 Normal atrial septal thickness without atrial level shunting by limited color doppler interrogation.
   

 

 AORTA 

 The aortic root and proximal ascending aorta are normal in size on limited imaging.   

 

 MITRAL VALVE 

 Trace-to-mild mitral valve regurgitation.  

 

 AORTIC VALVE 

 Trace aortic valve regurgitation.  

 

 TRICUSPID VALVE 

 There is trace tricuspid valve regurgitation.  

 The estimated pulmonary arterial pressure is 26 mmHg.  

 

 PULMONARY VALVE 

 The pulmonary valve is not well visualized.   

 

 VESSELS 

 The inferior vena cava is normal in size.   

 

 PERICARDIUM 

 No pericardial effusion.   

 

 

 

 

  Ramos Harper MD, FACC 

  (Electronically Signed) 

  Final Date:19 June 2018 20:17

## 2018-06-20 VITALS
SYSTOLIC BLOOD PRESSURE: 120 MMHG | OXYGEN SATURATION: 99 % | TEMPERATURE: 97.3 F | DIASTOLIC BLOOD PRESSURE: 93 MMHG | HEART RATE: 86 BPM | RESPIRATION RATE: 18 BRPM

## 2018-06-20 VITALS
RESPIRATION RATE: 18 BRPM | TEMPERATURE: 97.8 F | DIASTOLIC BLOOD PRESSURE: 82 MMHG | SYSTOLIC BLOOD PRESSURE: 121 MMHG | OXYGEN SATURATION: 99 % | HEART RATE: 81 BPM

## 2018-06-20 VITALS
RESPIRATION RATE: 16 BRPM | HEART RATE: 80 BPM | OXYGEN SATURATION: 99 % | DIASTOLIC BLOOD PRESSURE: 93 MMHG | SYSTOLIC BLOOD PRESSURE: 114 MMHG | TEMPERATURE: 98.4 F

## 2018-06-20 VITALS
SYSTOLIC BLOOD PRESSURE: 119 MMHG | HEART RATE: 90 BPM | RESPIRATION RATE: 18 BRPM | TEMPERATURE: 98.2 F | DIASTOLIC BLOOD PRESSURE: 90 MMHG | OXYGEN SATURATION: 98 %

## 2018-06-20 LAB
BUN SERPL-MCNC: 13 MG/DL (ref 7–18)
CALCIUM SERPL-MCNC: 8.8 MG/DL (ref 8.5–10.1)
CHLORIDE SERPL-SCNC: 104 MEQ/L (ref 98–107)
CREAT SERPL-MCNC: 0.84 MG/DL (ref 0.6–1.3)
ERYTHROCYTE [DISTWIDTH] IN BLOOD BY AUTOMATED COUNT: 16.9 % (ref 11.6–17.2)
GFR SERPLBLD BASED ON 1.73 SQ M-ARVRAT: 97 ML/MIN (ref 89–?)
GLUCOSE SERPL-MCNC: 92 MG/DL (ref 74–106)
HCO3 BLD-SCNC: 28.7 MEQ/L (ref 21–32)
HCT VFR BLD CALC: 44.1 % (ref 39–51)
HGB BLD-MCNC: 14 GM/DL (ref 13–17)
MCH RBC QN AUTO: 28.3 PG (ref 27–34)
MCHC RBC AUTO-ENTMCNC: 31.7 % (ref 32–36)
MCV RBC AUTO: 89.3 FL (ref 80–100)
PLATELET # BLD: 325 TH/MM3 (ref 150–450)
PMV BLD AUTO: 8.4 FL (ref 7–11)
RBC # BLD AUTO: 4.94 MIL/MM3 (ref 4.5–5.9)
SODIUM SERPL-SCNC: 138 MEQ/L (ref 136–145)
WBC # BLD AUTO: 4.8 TH/MM3 (ref 4–11)

## 2018-06-20 RX ADMIN — FOLIC ACID SCH MG: 1 TABLET ORAL at 11:02

## 2018-06-20 RX ADMIN — ENALAPRIL MALEATE SCH MG: 2.5 TABLET ORAL at 11:03

## 2018-06-20 RX ADMIN — MULTIPLE VITAMINS W/ MINERALS TAB SCH TAB: TAB at 11:03

## 2018-06-20 RX ADMIN — ALUMINUM HYDROXIDE, MAGNESIUM HYDROXIDE, AND SIMETHICONE PRN ML: 200; 200; 20 SUSPENSION ORAL at 05:06

## 2018-06-20 RX ADMIN — ENOXAPARIN SODIUM SCH MG: 60 INJECTION SUBCUTANEOUS at 10:00

## 2018-06-20 RX ADMIN — CARVEDILOL SCH MG: 3.12 TABLET, FILM COATED ORAL at 11:03

## 2018-06-20 RX ADMIN — ASPIRIN 81 MG SCH MG: 81 TABLET ORAL at 11:02

## 2018-06-20 RX ADMIN — FAMOTIDINE PRN MG: 20 TABLET, FILM COATED ORAL at 05:06

## 2018-06-20 RX ADMIN — NICOTINE SCH PATCH: 14 PATCH, EXTENDED RELEASE TOPICAL at 11:03

## 2018-06-20 RX ADMIN — FUROSEMIDE SCH MG: 20 TABLET ORAL at 11:03

## 2018-06-20 RX ADMIN — Medication SCH MG: at 11:02

## 2018-06-20 NOTE — HHI.FPPN
Subjective


Remarks


Patient was seen and evaluated this morning. He reports feeling better. Patient 

denies chest pain, heart palpitations, shortness of breath, nausea/vomiting, 

diarrhea and constipation. All questions were answered.


 (Le Hope MD R1)





Objective


Vitals





Vital Signs








  Date Time  Temp Pulse Resp B/P (MAP) Pulse Ox O2 Delivery O2 Flow Rate FiO2


 


6/20/18 04:00      Room Air  


 


6/20/18 04:00  80      


 


6/20/18 04:00 98.4 78 16 114/93 (100) 99   


 


6/20/18 00:00 97.8 81 18 121/82 (95) 99   


 


6/20/18 00:00      Room Air  


 


6/20/18 00:00  88      


 


6/19/18 20:00      Room Air  


 


6/19/18 20:00  82      


 


6/19/18 20:00 98.5 80 16 121/90 (100) 94   


 


6/19/18 16:04 97.2 82 18 122/84 (97) 97   


 


6/19/18 12:04 97.6 84 18 120/88 (99) 98   














I/O      


 


 6/19/18 6/19/18 6/19/18 6/20/18 6/20/18 6/20/18





 07:00 15:00 23:00 07:00 15:00 23:00


 


Intake Total 480 ml  480 ml 400 ml  


 


Output Total 400 ml  1800 ml   


 


Balance 80 ml  -1320 ml 400 ml  


 


      


 


Intake Oral 480 ml  480 ml 400 ml  


 


Output Urine Total 400 ml  1800 ml   


 


# Voids 2   2  


 


# Bowel Movements 1  0 1  








 (Le Hope MD R1)


Result Diagram:  


6/18/18 0550                                                                   

             6/18/18 0550





Imaging





Last Impressions








Chest X-Ray 6/16/18 0725 Signed





Impressions: 





 CONCLUSION: 





 Mild interstitial changes in the lower lung zones in a pattern suggesting inter





 stitial pulmonary edema. Given the enlarged cardiac silhouette a cardiogenic ca





 use is likely.





  





 


 


Head CT 6/16/18 0000 Signed





Impressions: 





 CONCLUSION:





 No acute intracranial abnormality is identified.





  





 








Objective Remarks


GENERAL: Thin appearing male lying in bed in no acute distress.


SKIN: Warm and dry. No rash.  Hyperpigmented.


HEENT: Atraumatic, normocephalic with extraocular motions intact. No 

rhinorrhea. No visible lymphadenopathy or JVD appreciated. 


CARDIOVASCULAR: Regular rate and rhythm without obvious murmurs, gallops, or 

rubs. 


RESPIRATORY: No increased work of breathing. Clear to auscultation bilaterally 

with no crackles, wheezes, or rhonchi. 


GASTROINTESTINAL: Positive bowel sounds. Abdomen soft, non-tender, 

nondistended. No masses appreciated.


MUSCULOSKELETAL: No cyanosis or edema. No calf tenderness.  Ambulating well per 

report


NEURO/PSYCH: Awake, alert, and oriented x3.  Slowed speech with normal 

judgment.  Patient drowsy, but not lethargic which appears to be his baseline.


Medications and IVs





Current Medications








 Medications


  (Trade)  Dose


 Ordered  Sig/James


 Route  Start Time


 Stop Time Status Last Admin


 


  (NS Flush)  2 ml  BID


 IV FLUSH  6/16/18 21:00


    6/19/18 20:29


 


 


  (NS Flush)  2 ml  UNSCH  PRN


 IV FLUSH  6/16/18 10:45


     


 


 


  (KCl)  20 meq  BID


 PO  6/16/18 21:00


    6/19/18 10:01


 


 


  (Aspirin Chew)  81 mg  DAILY


 CHEW  6/17/18 09:00


    6/19/18 10:01


 


 


  (Coreg)  3.125 mg  Q12HR


 PO  6/16/18 11:00


    6/19/18 20:29


 


 


  (KlonoPIN)  0.5 mg  BID  PRN


 PO  6/16/18 10:45


    6/19/18 20:29


 


 


  (Vasotec)  2.5 mg  DAILY


 PO  6/16/18 12:00


    6/19/18 10:01


 


 


  (Lasix)  20 mg  DAILY


 PO  6/17/18 09:00


    6/19/18 10:01


 


 


  (Romazicon Inj)  0.2 mg  Q1M  PRN


 IV PUSH  6/16/18 10:45


     


 


 


  (Ativan)  1 mg  Q4H  PRN


 PO  6/16/18 10:45


     


 


 


  (Ativan Inj)  1 mg  Q4H  PRN


 IV PUSH  6/16/18 10:45


    6/17/18 17:10


 


 


  (Ativan)  2 mg  Q2H  PRN


 PO  6/16/18 10:45


     


 


 


  (Ativan Inj)  2 mg  Q2H  PRN


 IV PUSH  6/16/18 10:45


     


 


 


  (Ativan Inj)  2 mg  Q1H  PRN


 IV PUSH  6/16/18 10:45


     


 


 


  (Ativan Inj)  2 mg  Q15M  PRN


 IV PUSH  6/16/18 10:45


     


 


 


  (Folate)  1 mg  DAILY


 PO  6/16/18 12:00


 6/21/18 11:59  6/19/18 10:01


 


 


  (Vitamin B1)  100 mg  DAILY


 PO  6/16/18 12:00


    6/19/18 10:02


 


 


  (Theragran M Tab)  1 tab  DAILY


 PO  6/16/18 12:00


 6/21/18 11:59  6/19/18 10:01


 


 


  (Zofran  Odt)  4 mg  Q6H  PRN


 PO  6/16/18 10:45


    6/19/18 00:11


 


 


  (Pepcid)  20 mg  BID  PRN


 PO  6/16/18 10:45


    6/20/18 05:06


 


 


  (Catapres)  0.1 mg  Q6H  PRN


 PO  6/16/18 10:45


     


 


 


  (Duoneb Neb)  1 ampule  Q6HR NEB  PRN


 NEB  6/16/18 10:45


     


 


 


  (Lovenox Inj)  60 mg  Q12H


 SQ  6/16/18 22:00


    6/19/18 20:30


 


 


  (Mag-Al Plus


 Susp Liq)  30 ml  Q6H  PRN


 PO  6/16/18 17:30


    6/20/18 05:06


 


 


  (Habitrol 14 Mg


 Patch.24 Hr)  1 patch  DAILY


 T-DERMAL  6/18/18 09:00


    6/19/18 10:02


 


 


 Miscellaneous


 Information  1  HS


 T-DERMAL  6/17/18 21:00


    6/19/18 20:29


 


 


  (Tylenol)  650 mg  Q4H  PRN


 PO  6/18/18 07:00


    6/19/18 00:11


 








 (Le Hope MD R1)


Urinary Catheter:  No


 (Le Hope MD R1)


Vascular Central Line Catheter:  No


 (Le Hope MD R1)





A/P


Assessment and Plan


Patient is a 52-year-old male admitted for acute on chronic CHF exacerbation.


Discharge Planning


Discharge home today. 


Patient will be given a one-month supply of cardiac medications upon discharge 

as well as information for PCP follow-up, discussed with case management.


 (Le Hope MD R1)


Problem List:  


(1) Acute on chronic congestive heart failure


ICD Codes:  I50.9 - Heart failure, unspecified


Status:  Acute


Plan:  Patient with history of CHF presenting with acute exacerbation.





On admission: 


* CMP grossly wnl. 


* BNP: 2128.


* CK-MB: wnl.


* Troponin: 0.08, 0.07, 0.25, 0.60, 0.49.


* Chest x-ray: Mild interstitial changes and lower lung zones suggesting 

interstitial pulmonary edema.  Enlarged cardiac silhouette.


* EKG: Sinus rhythm with PVCs.  Inverted T waves of lead 5, V6, 1, and aVL.  No 

ST elevation/depression.  (Per medical team read)





Studies: 


* Echocardiogram:  No evidence of LV thrombus. Severely dilated left ventricle. 

Wall thickness is normal. The left ventricular systolic function is severely 

reduced with an estimated ejection fraction less than 20%. There is diffuse 

global hypokinesis. The left atrial size is mildly dilated. Trace-to-mild 

mitral valve regurgitation. Trace aortic valve regurgitation. There is trace 

tricuspid valve regurgitation. The estimated pulmonary arterial pressure is 26 

mmHg.  





Orders: 


* CHF education ordered.





Medications:


* Continue home carvedilol, enalapril, aspirin, and Lasix. 


* Potassium 20 mEq daily.





Consults: 


* Cardiology: Advised against chronic anticoagulation due to noncompliance.





(2) Thrombus in heart chamber


ICD Codes:  I51.3 - Intracardiac thrombosis, not elsewhere classified


Status:  Acute


Plan:  Patient with recent hospitalization showing apical cardiac thrombus on 

echocardiogram.





Studies: 


* Echocardiogram 5/26/18: Moderately dilated left ventricle with normal wall 

thickness.  EF of 10-15%.  Basal posterior and basal lateral walls are mildly 

hypokinetic in all other segments are severely hypokinetic.  A 1.6 x 1.9 cm 

ovoid echodensity at the apex consistent with apical thrombus.


* Echocardiogram 6/19/18:  No evidence of LV thrombus. Severely dilated left 

ventricle. Wall thickness is normal. The left ventricular systolic function is 

severely reduced with an estimated ejection fraction less than 20%. There is 

diffuse global hypokinesis. The left atrial size is mildly dilated. Trace-to-

mild mitral valve regurgitation. Trace aortic valve regurgitation. There is 

trace tricuspid valve regurgitation. The estimated pulmonary arterial pressure 

is 26 mmHg.  








Patient thoroughly educated on seriousness of diagnosis.  Patient educated on 

possible outcomes including possible stroke or death. Patient states that he 

will be compliant with medications upon discharge, however due to finances he 

is unsure if he is able to continue all medications.  Patient to be provided 

with 30 day supply of medication upon discharge. 





Medications:


* Anticoagulation with Lovenox twice daily (1 mg per Kg).


* Patient to be discharged home on daily aspirin as he is a poor candidate for 

chronic anticoagulation due to noncompliance, chronic alcoholism, and increased 

risk of falls/trauma.





(3) Atrial fibrillation


ICD Codes:  I48.91 - Unspecified atrial fibrillation


Status:  Chronic


Plan:  Patient with reported history of atrial fibrillation.





Medications:


* Continue home Coreg.


* Patient anticoagulated on Lovenox at this time.


* Patient to be discharged home on daily aspirin as above.





(4) Altered mental status


ICD Codes:  R41.82 - Altered mental status


Status:  Acute


Plan:  Patient with altered mental status on exam secondary to alcoholism 

versus electrolyte abnormality versus fatigue.  Patient also with history of 

mood disorder per chart review.





* CIWA protocol in place.


* Continue to monitor, low threshold for psychiatric evaluation.





(5) Alcoholism


ICD Codes:  F10.20 - Alcoholism


Status:  Chronic


Plan:  Patient with reported history of alcoholism with last drink per report 2 

weeks ago.


On admission, he is elusive as to his drinking habits and last drink.


He was an opiate abuser in the past but quit.





Labs: 


* Ethanol: Less than 3.





Medications:


* CIWA protocol in place.


* Daily multivitamin, folic acid, and thiamine.





(6) Nausea


ICD Codes:  R11.0 - Nausea


Status:  Acute


Plan:  On admission, patient reporting nausea with one episode of vomiting 

approximately 2 days prior that was nonbloody.


Patient without complaints of nausea since admission. 





Medications:


* Zofran as needed for nausea/vomiting.





(7) Spasm


ICD Codes:  R25.2 - Cramp and spasm


Status:  Chronic


Plan:  Patient with reported history of chronic muscle spasms.





On admission: 


* CMP: Electrolytes within normal limits





Medications:


* Continue home clonazepam 0.5 mg twice daily as needed for spasm. 


* Tylenol as needed for pain. 





(8) Nutrition, metabolism, and development symptoms


ICD Codes:  R63.8 - Symptoms concerning nutrition, metabolism, and development


Status:  Acute


Plan:  Diet: 


* Regular diet as tolerated.


Fluids: 


* Patient appears hydrated, continue to monitor with diuresis.


Electrodes: 


* Monitor and replete as necessary. 





(9) DVT prophylaxis


Status:  Acute


Plan:  SCDs.


Lovenox twice daily for cardiac thrombus, discontinued on discharge.


 (Le Hope MD R1)


Problem List:  


(1) Acute on chronic congestive heart failure


ICD Codes:  I50.9 - Heart failure, unspecified


Status:  Acute


Plan:  Patient with history of CHF presenting with acute exacerbation.





On admission: 


* CMP grossly wnl. 


* BNP: 2128.


* CK-MB: wnl.


* Troponin: 0.08, 0.07, 0.25, 0.60, 0.49.


* Chest x-ray: Mild interstitial changes and lower lung zones suggesting 

interstitial pulmonary edema.  Enlarged cardiac silhouette.


* EKG: Sinus rhythm with PVCs.  Inverted T waves of lead 5, V6, 1, and aVL.  No 

ST elevation/depression.  (Per medical team read)





Studies: 


* Echocardiogram:  No evidence of LV thrombus. Severely dilated left ventricle. 

Wall thickness is normal. The left ventricular systolic function is severely 

reduced with an estimated ejection fraction less than 20%. There is diffuse 

global hypokinesis. The left atrial size is mildly dilated. Trace-to-mild 

mitral valve regurgitation. Trace aortic valve regurgitation. There is trace 

tricuspid valve regurgitation. The estimated pulmonary arterial pressure is 26 

mmHg.  





Orders: 


* CHF education ordered.





Medications:


* Continue home carvedilol, enalapril, aspirin, and Lasix. 


* Potassium 20 mEq daily.





Consults: 


* Cardiology: Advised against chronic anticoagulation due to noncompliance.





(2) Thrombus in heart chamber


ICD Codes:  I51.3 - Intracardiac thrombosis, not elsewhere classified


Status:  Acute


Plan:  Patient with recent hospitalization showing apical cardiac thrombus on 

echocardiogram.





Studies: 


* Echocardiogram 5/26/18: Moderately dilated left ventricle with normal wall 

thickness.  EF of 10-15%.  Basal posterior and basal lateral walls are mildly 

hypokinetic in all other segments are severely hypokinetic.  A 1.6 x 1.9 cm 

ovoid echodensity at the apex consistent with apical thrombus.


* Echocardiogram 6/19/18:  No evidence of LV thrombus. Severely dilated left 

ventricle. Wall thickness is normal. The left ventricular systolic function is 

severely reduced with an estimated ejection fraction less than 20%. There is 

diffuse global hypokinesis. The left atrial size is mildly dilated. Trace-to-

mild mitral valve regurgitation. Trace aortic valve regurgitation. There is 

trace tricuspid valve regurgitation. The estimated pulmonary arterial pressure 

is 26 mmHg.  








Patient thoroughly educated on seriousness of diagnosis.  Patient educated on 

possible outcomes including possible stroke or death. Patient states that he 

will be compliant with medications upon discharge, however due to finances he 

is unsure if he is able to continue all medications.  Patient to be provided 

with 30 day supply of medication upon discharge. 





Medications:


* Anticoagulation with Lovenox twice daily (1 mg per Kg).


* Patient to be discharged home on daily aspirin as he is a poor candidate for 

chronic anticoagulation due to noncompliance, chronic alcoholism, and increased 

risk of falls/trauma.





(3) Atrial fibrillation


ICD Codes:  I48.91 - Unspecified atrial fibrillation


Status:  Chronic


Plan:  Patient with reported history of atrial fibrillation.





Medications:


* Continue home Coreg.


* Patient anticoagulated on Lovenox at this time.


* Patient to be discharged home on daily aspirin as above.





(4) Altered mental status


ICD Codes:  R41.82 - Altered mental status


Status:  Acute


Plan:  Patient with altered mental status on exam secondary to alcoholism 

versus electrolyte abnormality versus fatigue.  Patient also with history of 

mood disorder per chart review.





* CIWA protocol in place.


* Continue to monitor, low threshold for psychiatric evaluation.





(5) Alcoholism


ICD Codes:  F10.20 - Alcoholism


Status:  Chronic


Plan:  Patient with reported history of alcoholism with last drink per report 2 

weeks ago.


On admission, he is elusive as to his drinking habits and last drink.


He was an opiate abuser in the past but quit.





Labs: 


* Ethanol: Less than 3.





Medications:


* CIWA protocol in place.


* Daily multivitamin, folic acid, and thiamine.





(6) Nausea


ICD Codes:  R11.0 - Nausea


Status:  Acute


Plan:  On admission, patient reporting nausea with one episode of vomiting 

approximately 2 days prior that was nonbloody.


Patient without complaints of nausea since admission. 





Medications:


* Zofran as needed for nausea/vomiting.





(7) Spasm


ICD Codes:  R25.2 - Cramp and spasm


Status:  Chronic


Plan:  Patient with reported history of chronic muscle spasms.





On admission: 


* CMP: Electrolytes within normal limits





Medications:


* Continue home clonazepam 0.5 mg twice daily as needed for spasm. 


* Tylenol as needed for pain. 





(8) Nutrition, metabolism, and development symptoms


ICD Codes:  R63.8 - Symptoms concerning nutrition, metabolism, and development


Status:  Acute


Plan:  Diet: 


* Regular diet as tolerated.


Fluids: 


* Patient appears hydrated, continue to monitor with diuresis.


Electrodes: 


* Monitor and replete as necessary. 





(9) DVT prophylaxis


Status:  Acute


Plan:  SCDs.


Lovenox twice daily for cardiac thrombus, discontinued on discharge.








See the residents documentation for details. I saw and evaluated the patient 

regarding the key portions of this evaluation and agree with the residents 

findings and plans as written.


Parts of this note were created using dragon voice recognition software 

program. While efforts were made to correct any mistakes made by this software, 

some mistakes, errors, and omissions may remain in the final note that were not 

caught when the note was originally created. Plan of care was discussed and 

agreed upon with the patient as specifically documented in the above note. An 

opportunity to ask questions with explanation was provided. Patient voiced 

understanding on all information reviewed and discussed.


 (Sumeet Eugene MD)





Problem Qualifiers





(1) Acute on chronic congestive heart failure:  


Qualified Codes:  I50.23 - Acute on chronic systolic (congestive) heart failure


(2) Atrial fibrillation:  


Qualified Codes:  I48.91 - Unspecified atrial fibrillation


(3) Altered mental status:  


Qualified Codes:  R41.82 - Altered mental status, unspecified








Le Hope MD R1 Jun 20, 2018 08:35


Sumeet Eugene MD Jun 21, 2018 10:45

## 2025-04-11 NOTE — PD
HPI


Chief Complaint:  General Weakness


Time Seen by Provider:  07:16


Travel History


International Travel<30 days:  No


Contact w/Intl Traveler<30days:  No


Traveled to known affect area:  No





History of Present Illness


HPI


The patient is a 52-year-old  male who presents to the emergency 

department via EMS for generalized weakness.  The patient states he was 

recently hospitalized for bronchitis, thinks he was discharged to early.  The 

patient continues to have a productive cough, occasionally producing pink 

sputum.  He also complains of generalized weakness.  The patient does note mild 

shortness of breath but denies any chest pain.  He denies any significant 

swelling to lower extremities and denies any nausea, vomiting, diarrhea, or 

abdominal pain.  Symptoms are moderate.





PFSH


Past Medical History


Asthma:  No


Atrial Fibrillation:  Yes


Autoimmune Disease:  No


Blood Disorders:  No


Anxiety:  Yes


Depression:  Yes


Heart Rhythm Problems:  Yes (A FIB)


Cancer:  No


Cardiovascular Problems:  Yes


High Cholesterol:  Yes


Chemotherapy:  No


Chest Pain:  Yes (Dx at 22)


Congestive Heart Failure:  No


COPD:  No


Diabetes:  No


Diminished Hearing:  No


Gastrointestinal Disorders:  Yes


Genitourinary:  No


Hypertension:  Yes


Immune Disorder:  No


Musculoskeletal:  No


Neurologic:  No


Psychiatric:  Yes (STATES HE WAS DIAGNOSED WITH SCHIZOPHRENIA PREVIOUSLY BUT 

DOESNT TAKE MEDS)


Reproductive:  No


Respiratory:  Yes (BRONCHITIS)


Immunizations Current:  Yes


Radiation Therapy:  No


Seizures:  Yes


Sleep Apnea:  No


Thyroid Disease:  No


Ulcer:  Yes (GI ULCERS)


Tetanus Vaccination:  < 5 Years


Influenza Vaccination:  Yes





Past Surgical History


Endocrine Surgery:  No


Other Surgery:  Yes (Elbow)





Social History


Alcohol Use:  Yes (OCCASIONALLY)


Tobacco Use:  Yes (1/2 PPD)


Substance Use:  Yes (marijuana, opiates)





Allergies-Medications


(Allergen,Severity, Reaction):  


Coded Allergies:  


     No Known Allergies (Verified  Allergy, Unknown, 5/24/18)


Reported Meds & Prescriptions





Reported Meds & Active Scripts


Active


Coreg (Carvedilol) 3.125 Mg Tab 3.125 Mg PO Q12HR 30 Days


Furosemide 20 Mg Tab 20 Mg PO DAILY 30 Days


Enalapril (Enalapril Maleate) 2.5 Mg Tab 2.5 Mg PO DAILY 30 Days


Coumadin (Warfarin) 5 Mg Tab 5 Mg PO DAILY@1600 30 Days


     You need to follow with primary Care weekly to avoid dangerous levels


     of this medication


Ventolin Hfa 18 GM Inh (Albuterol Sulfate) 90 Mcg/Act Aer 1 Puff INH Q4H PRN 30 

Days


Klonopin (Clonazepam) 0.5 Mg Tab 0.5 Mg PO BID PRN








Review of Systems


Except as stated in HPI:  all other systems reviewed are Neg


General / Constitutional:  No: Fever


HENT:  No: Lightheadedness


Cardiovascular:  No: Chest Pain or Discomfort


Respiratory:  Positive: Cough, Shortness of Breath


Gastrointestinal:  No: Nausea, Vomiting


Musculoskeletal:  Positive: Weakness, No: Edema


Neurologic:  Positive: Weakness


Psychiatric:  Positive: Substance Abuse (History of alcohol use, denies alcohol 

last night)





Physical Exam


Narrative


GENERAL: Awake, alert, pleasant 52-year-old male who appears his stated age and 

is in no acute respiratory distress.  Somewhat lethargic.  Cachectic.


SKIN: Focused skin assessment warm/dry.


HEAD: Atraumatic. Normocephalic. 


EYES: Pupils equal and round.  3 mm bilateral and reactive.  EOMs are intact.


ENT: No nasal bleeding or discharge.  Mucous membranes pink and moist.


NECK: Trachea midline. No JVD. 


CARDIOVASCULAR: Regular rate and rhythm.  No murmur appreciated.  Heart rate in 

the 90s.


RESPIRATORY: No accessory muscle use.  Few crackles in the bases bilaterally.


GASTROINTESTINAL: Abdomen soft, non-tender, nondistended. 


MUSCULOSKELETAL: No obvious deformities. No clubbing.  No cyanosis.  No edema. 


NEUROLOGICAL: Awake, but lethargic.  No obvious cranial nerve deficits.  Motor 

grossly within normal limits. Normal speech.  Oriented to place, name, and year.


PSYCHIATRIC: Appropriate mood and affect; insight and judgment normal.





Data


Data


Last Documented VS





Vital Signs








  Date Time  Temp Pulse Resp B/P (MAP) Pulse Ox O2 Delivery O2 Flow Rate FiO2


 


6/16/18 08:27   18  96   


 


6/16/18 06:50 97.5 92  141/99 (113)    








Orders





 Orders


Complete Blood Count With Diff (6/16/18 07:25)


Comprehensive Metabolic Panel (6/16/18 07:25)


B-Type Natriuretic Peptide (6/16/18 07:25)


Act Partial Throm Time (Ptt) (6/16/18 07:25)


Prothrombin Time / Inr (Pt) (6/16/18 07:25)


Magnesium (Mg) (6/16/18 07:25)


Ckmb (Isoenzyme) Profile (6/16/18 07:25)


Troponin I (6/16/18 07:25)


Urinalysis - C+S If Indicated (6/16/18 07:25)


Iv Access Insert/Monitor (6/16/18 07:25)


Electrocardiogram (6/16/18 07:25)


Ecg Monitoring (6/16/18 07:25)


Oximetry (6/16/18 07:25)


Oxygen Administration (6/16/18 07:25)


Chest, Single Ap (6/16/18 07:25)


Sodium Chloride 0.9% Flush (Ns Flush) (6/16/18 07:30)


Ct Brain W/O Iv Contrast(Rout) (6/16/18 )


Lactic Acid (6/16/18 07:25)


Alcohol (Ethanol) (6/16/18 07:25)


Morphine Inj (Morphine Inj) (6/16/18 08:30)


Metoclopramide Inj (Reglan Inj) (6/16/18 08:30)


Ammonia (6/16/18 08:39)


CKMB (6/16/18 07:44)


CKMB% (6/16/18 07:44)


Aspirin Chew (Aspirin Chew) (6/16/18 09:15)


Enoxaparin Inj (Lovenox Inj) (6/16/18 09:30)


Admit Order (Ed Use Only) (6/16/18 09:45)





Labs





Laboratory Tests








Test


  6/16/18


07:44 6/16/18


08:55


 


White Blood Count 6.2 TH/MM3  


 


Red Blood Count 3.67 MIL/MM3  


 


Hemoglobin 10.6 GM/DL  


 


Hematocrit 32.7 %  


 


Mean Corpuscular Volume 89.2 FL  


 


Mean Corpuscular Hemoglobin 28.8 PG  


 


Mean Corpuscular Hemoglobin


Concent 32.3 % 


  


 


 


Red Cell Distribution Width 16.6 %  


 


Platelet Count 305 TH/MM3  


 


Mean Platelet Volume 7.5 FL  


 


Neutrophils (%) (Auto) 82.6 %  


 


Lymphocytes (%) (Auto) 8.7 %  


 


Monocytes (%) (Auto) 7.5 %  


 


Eosinophils (%) (Auto) 0.5 %  


 


Basophils (%) (Auto) 0.7 %  


 


Neutrophils # (Auto) 5.1 TH/MM3  


 


Lymphocytes # (Auto) 0.5 TH/MM3  


 


Monocytes # (Auto) 0.5 TH/MM3  


 


Eosinophils # (Auto) 0.0 TH/MM3  


 


Basophils # (Auto) 0.0 TH/MM3  


 


CBC Comment DIFF FINAL  


 


Differential Comment   


 


Prothrombin Time 10.7 SEC  


 


Prothromb Time International


Ratio 1.1 RATIO 


  


 


 


Activated Partial


Thromboplast Time 25.7 SEC 


  


 


 


Blood Urea Nitrogen 17 MG/DL  


 


Creatinine 0.92 MG/DL  


 


Random Glucose 102 MG/DL  


 


Total Protein 5.7 GM/DL  


 


Albumin 2.5 GM/DL  


 


Calcium Level 7.8 MG/DL  


 


Magnesium Level 2.0 MG/DL  


 


Alkaline Phosphatase 110 U/L  


 


Aspartate Amino Transf


(AST/SGOT) 21 U/L 


  


 


 


Alanine Aminotransferase


(ALT/SGPT) 21 U/L 


  


 


 


Total Bilirubin 0.1 MG/DL  


 


Sodium Level 145 MEQ/L  


 


Potassium Level 4.0 MEQ/L  


 


Chloride Level 114 MEQ/L  


 


Carbon Dioxide Level 23.8 MEQ/L  


 


Anion Gap 7 MEQ/L  


 


Estimat Glomerular Filtration


Rate 87 ML/MIN 


  


 


 


Lactic Acid Level 1.3 mmol/L  


 


Total Creatine Kinase 280 U/L  


 


Creatine Kinase MB 7.5 NG/ML  


 


Troponin I 0.08 NG/ML  


 


B-Type Natriuretic Peptide 2128 PG/ML  


 


Ethyl Alcohol Level


  LESS THAN 3


MG/DL 


 


 


Ammonia  24 MCMOL/L 











[facility]


Medical Decision Making


Medical Screen Exam Complete:  Yes


Emergency Medical Condition:  Yes


Medical Record Reviewed:  Yes


Interpretation(s)


EKG reveals sinus rhythm with occasional supraventricular premature complex.  

Inverted T waves noted in lead 5, V6, 1, and aVL.








Last Impressions








Chest X-Ray 6/16/18 0725 Signed





Impressions: 





 CONCLUSION: 





 Mild interstitial changes in the lower lung zones in a pattern suggesting inter





 stitial pulmonary edema. Given the enlarged cardiac silhouette a cardiogenic ca





 use is likely.





  





 


 


Head CT 6/16/18 0000 Signed





Impressions: 





 CONCLUSION:





 No acute intracranial abnormality is identified.





  





 








Differential Diagnosis


Differential diagnosis includes congestive heart failure, bronchitis, pneumonia

, alcohol use, hyponatremia, dehydration, encephalopathy, pulmonary embolism, 

dehydration.


Narrative Course


IV was established, labs were drawn and sent, and the patient was placed on 

cardiac telemetry monitoring and continuous pulse oximetry monitoring.  EKG was 

ordered and interpreted.  Chest x-ray was obtained.  CT of the brain was 

ordered to evaluate for possible subdural hemorrhage with Coumadin use and 

lethargy.  The patient's chest x-ray does reveal interstitial edema.  BNP is 

greater than 2000.  Previous BMPs have been elevated as high as 4000.  I 

reviewed the patient's last admission, he had an echocardiogram which revealed 

an ejection fraction of 10-15% with an apical thrombus.  The patient was placed 

on Coumadin, however, his INR today subtherapeutic at 1.1.  I suspect the 

patient is non-compliant with his warfarin dosing.  Therefore, the patient was 

administer Lovenox 1 mg/kg subcutaneously.  The patient does not currently have 

an AICD/pacemaker, may be a candidate in the past of his ejection fraction does 

not improve.  However, the patient is symptomatic with shortness of breath and 

cough, therefore, will be admitted to the on-call medical service.





Physician Communication


Physician Communication


The on-call medical service was paged for admission.  I discussed the patient 

with the resident who agree with admission.





Diagnosis





 Primary Impression:  


 CHF (congestive heart failure)


 Qualified Codes:  I50.9 - Heart failure, unspecified


 Additional Impressions:  


 Left ventricular apical thrombus


 Elevated troponin





Admitting Information


Admitting Physician Requests:  Admit


Condition:  Stable











Slim Guo MD Jun 16, 2018 07:31
home